# Patient Record
Sex: MALE | Race: WHITE | Employment: OTHER | URBAN - METROPOLITAN AREA
[De-identification: names, ages, dates, MRNs, and addresses within clinical notes are randomized per-mention and may not be internally consistent; named-entity substitution may affect disease eponyms.]

---

## 2024-09-19 ENCOUNTER — HOSPITAL ENCOUNTER (OUTPATIENT)
Age: 74
Setting detail: SPECIMEN
Discharge: HOME | End: 2024-09-19

## 2024-09-19 LAB
ANION GAP SERPL CALCULATED.3IONS-SCNC: 5 MMOL/L (ref 9–16)
BUN SERPL-MCNC: 17 MG/DL (ref 8–23)
CALCIUM SERPL-MCNC: 9.2 MG/DL (ref 8.6–10.4)
CHLORIDE SERPL-SCNC: 91 MMOL/L (ref 98–107)
CO2 SERPL-SCNC: 43 MMOL/L (ref 20–31)
CREAT SERPL-MCNC: 0.8 MG/DL (ref 0.7–1.2)
ERYTHROCYTE [DISTWIDTH] IN BLOOD BY AUTOMATED COUNT: 15 % (ref 11.8–14.4)
GFR, ESTIMATED: >90 ML/MIN/1.73M2
GLUCOSE SERPL-MCNC: 205 MG/DL (ref 74–99)
HCT VFR BLD AUTO: 32.7 % (ref 40.7–50.3)
HGB BLD-MCNC: 9.6 G/DL (ref 13–17)
MCH RBC QN AUTO: 29 PG (ref 25.2–33.5)
MCHC RBC AUTO-ENTMCNC: 29.4 G/DL (ref 28.4–34.8)
MCV RBC AUTO: 98.8 FL (ref 82.6–102.9)
NRBC BLD-RTO: 0 PER 100 WBC
PLATELET # BLD AUTO: 272 K/UL (ref 138–453)
PMV BLD AUTO: 10.9 FL (ref 8.1–13.5)
POTASSIUM SERPL-SCNC: 4.4 MMOL/L (ref 3.7–5.3)
RBC # BLD AUTO: 3.31 M/UL (ref 4.21–5.77)
SODIUM SERPL-SCNC: 139 MMOL/L (ref 136–145)
WBC OTHER # BLD: 11.8 K/UL (ref 3.5–11.3)

## 2024-09-19 PROCEDURE — 36415 COLL VENOUS BLD VENIPUNCTURE: CPT

## 2024-09-19 PROCEDURE — P9603 ONE-WAY ALLOW PRORATED MILES: HCPCS

## 2024-09-19 PROCEDURE — 80048 BASIC METABOLIC PNL TOTAL CA: CPT

## 2024-09-19 PROCEDURE — 85027 COMPLETE CBC AUTOMATED: CPT

## 2024-09-24 ENCOUNTER — APPOINTMENT (OUTPATIENT)
Dept: GENERAL RADIOLOGY | Age: 74
DRG: 189 | End: 2024-09-24
Attending: EMERGENCY MEDICINE
Payer: MEDICARE

## 2024-09-24 ENCOUNTER — APPOINTMENT (OUTPATIENT)
Dept: GENERAL RADIOLOGY | Age: 74
DRG: 189 | End: 2024-09-24
Payer: MEDICARE

## 2024-09-24 ENCOUNTER — HOSPITAL ENCOUNTER (INPATIENT)
Age: 74
LOS: 10 days | Discharge: NURSING, REHAB OR LONG-TERM CARE | DRG: 189 | End: 2024-10-04
Attending: EMERGENCY MEDICINE | Admitting: INTERNAL MEDICINE
Payer: MEDICARE

## 2024-09-24 DIAGNOSIS — J18.9 PNEUMONIA OF RIGHT LOWER LOBE DUE TO INFECTIOUS ORGANISM: ICD-10-CM

## 2024-09-24 DIAGNOSIS — J44.1 COPD EXACERBATION (HCC): Primary | ICD-10-CM

## 2024-09-24 DIAGNOSIS — J96.22 ACUTE ON CHRONIC RESPIRATORY FAILURE WITH HYPOXIA AND HYPERCAPNIA: ICD-10-CM

## 2024-09-24 DIAGNOSIS — R06.02 SHORTNESS OF BREATH: ICD-10-CM

## 2024-09-24 DIAGNOSIS — I48.0 PAROXYSMAL ATRIAL FIBRILLATION (HCC): ICD-10-CM

## 2024-09-24 DIAGNOSIS — J43.9 PULMONARY EMPHYSEMA, UNSPECIFIED EMPHYSEMA TYPE (HCC): ICD-10-CM

## 2024-09-24 DIAGNOSIS — J96.21 ACUTE ON CHRONIC RESPIRATORY FAILURE WITH HYPOXIA AND HYPERCAPNIA: ICD-10-CM

## 2024-09-24 PROBLEM — J16.0 CAP (COMMUNITY ACQUIRED PNEUMONIA) DUE TO CHLAMYDIA SPECIES: Status: ACTIVE | Noted: 2024-09-24

## 2024-09-24 LAB
ALBUMIN SERPL-MCNC: 3.7 G/DL (ref 3.5–5.2)
ALP SERPL-CCNC: 78 U/L (ref 40–129)
ALT SERPL-CCNC: 11 U/L (ref 5–41)
ANION GAP SERPL CALCULATED.3IONS-SCNC: ABNORMAL MMOL/L (ref 9–17)
ARTERIAL PATENCY WRIST A: ABNORMAL
AST SERPL-CCNC: 17 U/L
B PARAP IS1001 DNA NPH QL NAA+NON-PROBE: NOT DETECTED
B PERT DNA SPEC QL NAA+PROBE: NOT DETECTED
BASOPHILS # BLD: 0.1 K/UL (ref 0–0.2)
BASOPHILS NFR BLD: 1 % (ref 0–2)
BDY SITE: ABNORMAL
BILIRUB DIRECT SERPL-MCNC: 0.1 MG/DL
BILIRUB INDIRECT SERPL-MCNC: 0.5 MG/DL (ref 0–1)
BILIRUB SERPL-MCNC: 0.6 MG/DL (ref 0.3–1.2)
BODY TEMPERATURE: 37
BUN SERPL-MCNC: 15 MG/DL (ref 8–23)
C PNEUM DNA NPH QL NAA+NON-PROBE: NOT DETECTED
CALCIUM SERPL-MCNC: 9.3 MG/DL (ref 8.6–10.4)
CHLORIDE SERPL-SCNC: 87 MMOL/L (ref 98–107)
CO2 SERPL-SCNC: >45 MMOL/L (ref 20–31)
COHGB MFR BLD: 1.9 %
CREAT SERPL-MCNC: 0.6 MG/DL (ref 0.7–1.2)
EOSINOPHIL # BLD: 0.1 K/UL (ref 0–0.4)
EOSINOPHILS RELATIVE PERCENT: 1 % (ref 0–4)
ERYTHROCYTE [DISTWIDTH] IN BLOOD BY AUTOMATED COUNT: 15.8 % (ref 11.5–14.9)
FIO2 ON VENT: 40 %
FLUAV RNA NPH QL NAA+NON-PROBE: NOT DETECTED
FLUBV RNA NPH QL NAA+NON-PROBE: NOT DETECTED
GAS FLOW.O2 O2 DELIVERY SYS: ABNORMAL L/MIN
GFR, ESTIMATED: >90 ML/MIN/1.73M2
GLUCOSE SERPL-MCNC: 144 MG/DL (ref 70–99)
HADV DNA NPH QL NAA+NON-PROBE: NOT DETECTED
HCO3 ARTERIAL: 52.5 MMOL/L
HCOV 229E RNA NPH QL NAA+NON-PROBE: NOT DETECTED
HCOV HKU1 RNA NPH QL NAA+NON-PROBE: NOT DETECTED
HCOV NL63 RNA NPH QL NAA+NON-PROBE: NOT DETECTED
HCOV OC43 RNA NPH QL NAA+NON-PROBE: NOT DETECTED
HCT VFR BLD AUTO: 29.5 % (ref 41–53)
HGB BLD-MCNC: 9.6 G/DL (ref 13.5–17.5)
HMPV RNA NPH QL NAA+NON-PROBE: NOT DETECTED
HPIV1 RNA NPH QL NAA+NON-PROBE: NOT DETECTED
HPIV2 RNA NPH QL NAA+NON-PROBE: NOT DETECTED
HPIV3 RNA NPH QL NAA+NON-PROBE: NOT DETECTED
HPIV4 RNA NPH QL NAA+NON-PROBE: NOT DETECTED
LACTATE BLDV-SCNC: 0.7 MMOL/L (ref 0.5–1.9)
LACTATE BLDV-SCNC: 1.2 MMOL/L (ref 0.5–1.9)
LIPASE SERPL-CCNC: 17 U/L (ref 13–60)
LYMPHOCYTES NFR BLD: 1.2 K/UL (ref 1–4.8)
LYMPHOCYTES RELATIVE PERCENT: 11 % (ref 24–44)
M PNEUMO DNA NPH QL NAA+NON-PROBE: NOT DETECTED
MCH RBC QN AUTO: 29.9 PG (ref 26–34)
MCHC RBC AUTO-ENTMCNC: 32.7 G/DL (ref 31–37)
MCV RBC AUTO: 91.5 FL (ref 80–100)
METHEMOGLOBIN: 0.2 %
MONOCYTES NFR BLD: 1.4 K/UL (ref 0.1–1.3)
MONOCYTES NFR BLD: 13 % (ref 1–7)
NEUTROPHILS NFR BLD: 74 % (ref 36–66)
NEUTS SEG NFR BLD: 8.4 K/UL (ref 1.3–9.1)
O2 SAT, ARTERIAL: 96.7 %
PCO2 ARTERIAL: 94 MMHG
PH ARTERIAL: 7.36
PLATELET # BLD AUTO: 309 K/UL (ref 150–450)
PMV BLD AUTO: 8.3 FL (ref 6–12)
PO2 ARTERIAL: 119 MMHG
POSITIVE BASE EXCESS, ART: 27 MMOL/L (ref 0–2)
POTASSIUM SERPL-SCNC: 4.6 MMOL/L (ref 3.7–5.3)
PROCALCITONIN SERPL-MCNC: 0.06 NG/ML
PROT SERPL-MCNC: 7.1 G/DL (ref 6.4–8.3)
PT. POSITION: ABNORMAL
RBC # BLD AUTO: 3.23 M/UL (ref 4.5–5.9)
RESPIRATORY RATE: 22
RSV RNA NPH QL NAA+NON-PROBE: NOT DETECTED
RV+EV RNA NPH QL NAA+NON-PROBE: NOT DETECTED
SARS-COV-2 RNA NPH QL NAA+NON-PROBE: NOT DETECTED
SODIUM SERPL-SCNC: 137 MMOL/L (ref 135–144)
SPECIMEN DESCRIPTION: NORMAL
TEXT FOR RESPIRATORY: ABNORMAL
WBC OTHER # BLD: 11.2 K/UL (ref 3.5–11)

## 2024-09-24 PROCEDURE — 80048 BASIC METABOLIC PNL TOTAL CA: CPT

## 2024-09-24 PROCEDURE — 2060000000 HC ICU INTERMEDIATE R&B

## 2024-09-24 PROCEDURE — 85025 COMPLETE CBC W/AUTO DIFF WBC: CPT

## 2024-09-24 PROCEDURE — 94640 AIRWAY INHALATION TREATMENT: CPT

## 2024-09-24 PROCEDURE — 71045 X-RAY EXAM CHEST 1 VIEW: CPT

## 2024-09-24 PROCEDURE — 6360000002 HC RX W HCPCS: Performed by: INTERNAL MEDICINE

## 2024-09-24 PROCEDURE — 2580000003 HC RX 258

## 2024-09-24 PROCEDURE — 87641 MR-STAPH DNA AMP PROBE: CPT

## 2024-09-24 PROCEDURE — 6370000000 HC RX 637 (ALT 250 FOR IP): Performed by: EMERGENCY MEDICINE

## 2024-09-24 PROCEDURE — 2580000003 HC RX 258: Performed by: EMERGENCY MEDICINE

## 2024-09-24 PROCEDURE — 96375 TX/PRO/DX INJ NEW DRUG ADDON: CPT

## 2024-09-24 PROCEDURE — 36600 WITHDRAWAL OF ARTERIAL BLOOD: CPT

## 2024-09-24 PROCEDURE — 82805 BLOOD GASES W/O2 SATURATION: CPT

## 2024-09-24 PROCEDURE — 94761 N-INVAS EAR/PLS OXIMETRY MLT: CPT

## 2024-09-24 PROCEDURE — 93005 ELECTROCARDIOGRAM TRACING: CPT | Performed by: EMERGENCY MEDICINE

## 2024-09-24 PROCEDURE — 6370000000 HC RX 637 (ALT 250 FOR IP): Performed by: INTERNAL MEDICINE

## 2024-09-24 PROCEDURE — 0202U NFCT DS 22 TRGT SARS-COV-2: CPT

## 2024-09-24 PROCEDURE — 99285 EMERGENCY DEPT VISIT HI MDM: CPT

## 2024-09-24 PROCEDURE — 6360000002 HC RX W HCPCS: Performed by: EMERGENCY MEDICINE

## 2024-09-24 PROCEDURE — 96374 THER/PROPH/DIAG INJ IV PUSH: CPT

## 2024-09-24 PROCEDURE — 2500000003 HC RX 250 WO HCPCS: Performed by: EMERGENCY MEDICINE

## 2024-09-24 PROCEDURE — 99291 CRITICAL CARE FIRST HOUR: CPT | Performed by: INTERNAL MEDICINE

## 2024-09-24 PROCEDURE — 84145 PROCALCITONIN (PCT): CPT

## 2024-09-24 PROCEDURE — 36415 COLL VENOUS BLD VENIPUNCTURE: CPT

## 2024-09-24 PROCEDURE — 74018 RADEX ABDOMEN 1 VIEW: CPT

## 2024-09-24 PROCEDURE — 87449 NOS EACH ORGANISM AG IA: CPT

## 2024-09-24 PROCEDURE — 2580000003 HC RX 258: Performed by: INTERNAL MEDICINE

## 2024-09-24 PROCEDURE — 2700000000 HC OXYGEN THERAPY PER DAY

## 2024-09-24 PROCEDURE — 6370000000 HC RX 637 (ALT 250 FOR IP)

## 2024-09-24 PROCEDURE — 83690 ASSAY OF LIPASE: CPT

## 2024-09-24 PROCEDURE — 94660 CPAP INITIATION&MGMT: CPT

## 2024-09-24 PROCEDURE — 5A09457 ASSISTANCE WITH RESPIRATORY VENTILATION, 24-96 CONSECUTIVE HOURS, CONTINUOUS POSITIVE AIRWAY PRESSURE: ICD-10-PCS | Performed by: EMERGENCY MEDICINE

## 2024-09-24 PROCEDURE — 87899 AGENT NOS ASSAY W/OPTIC: CPT

## 2024-09-24 PROCEDURE — 83605 ASSAY OF LACTIC ACID: CPT

## 2024-09-24 PROCEDURE — 80076 HEPATIC FUNCTION PANEL: CPT

## 2024-09-24 RX ORDER — DIPHENHYDRAMINE HYDROCHLORIDE 50 MG/ML
25 INJECTION INTRAMUSCULAR; INTRAVENOUS ONCE
Status: COMPLETED | OUTPATIENT
Start: 2024-09-24 | End: 2024-09-24

## 2024-09-24 RX ORDER — LANOLIN ALCOHOL/MO/W.PET/CERES
3 CREAM (GRAM) TOPICAL NIGHTLY
Status: DISCONTINUED | OUTPATIENT
Start: 2024-09-24 | End: 2024-10-04 | Stop reason: HOSPADM

## 2024-09-24 RX ORDER — ACETAMINOPHEN 650 MG/1
650 SUPPOSITORY RECTAL EVERY 6 HOURS PRN
Status: DISCONTINUED | OUTPATIENT
Start: 2024-09-24 | End: 2024-10-04 | Stop reason: HOSPADM

## 2024-09-24 RX ORDER — ATORVASTATIN CALCIUM 40 MG/1
40 TABLET, FILM COATED ORAL EVERY EVENING
COMMUNITY

## 2024-09-24 RX ORDER — IPRATROPIUM BROMIDE AND ALBUTEROL SULFATE 2.5; .5 MG/3ML; MG/3ML
1 SOLUTION RESPIRATORY (INHALATION)
Status: DISCONTINUED | OUTPATIENT
Start: 2024-09-24 | End: 2024-09-29

## 2024-09-24 RX ORDER — SODIUM CHLORIDE 0.9 % (FLUSH) 0.9 %
5-40 SYRINGE (ML) INJECTION EVERY 12 HOURS SCHEDULED
Status: DISCONTINUED | OUTPATIENT
Start: 2024-09-24 | End: 2024-10-04 | Stop reason: HOSPADM

## 2024-09-24 RX ORDER — CARVEDILOL 6.25 MG/1
6.25 TABLET ORAL 2 TIMES DAILY
Status: ON HOLD | COMMUNITY
End: 2024-10-04 | Stop reason: HOSPADM

## 2024-09-24 RX ORDER — FUROSEMIDE 40 MG
40 TABLET ORAL EVERY MORNING
COMMUNITY

## 2024-09-24 RX ORDER — SODIUM CHLORIDE 0.9 % (FLUSH) 0.9 %
5-40 SYRINGE (ML) INJECTION PRN
Status: DISCONTINUED | OUTPATIENT
Start: 2024-09-24 | End: 2024-10-04 | Stop reason: HOSPADM

## 2024-09-24 RX ORDER — ONDANSETRON 2 MG/ML
4 INJECTION INTRAMUSCULAR; INTRAVENOUS EVERY 6 HOURS PRN
Status: DISCONTINUED | OUTPATIENT
Start: 2024-09-24 | End: 2024-10-04 | Stop reason: HOSPADM

## 2024-09-24 RX ORDER — PANTOPRAZOLE SODIUM 40 MG/1
40 TABLET, DELAYED RELEASE ORAL
Status: DISCONTINUED | OUTPATIENT
Start: 2024-09-25 | End: 2024-10-04 | Stop reason: HOSPADM

## 2024-09-24 RX ORDER — POTASSIUM CHLORIDE 7.45 MG/ML
10 INJECTION INTRAVENOUS PRN
Status: DISCONTINUED | OUTPATIENT
Start: 2024-09-24 | End: 2024-10-04 | Stop reason: HOSPADM

## 2024-09-24 RX ORDER — IPRATROPIUM BROMIDE AND ALBUTEROL SULFATE 2.5; .5 MG/3ML; MG/3ML
1 SOLUTION RESPIRATORY (INHALATION)
Status: DISCONTINUED | OUTPATIENT
Start: 2024-09-24 | End: 2024-10-04 | Stop reason: HOSPADM

## 2024-09-24 RX ORDER — IPRATROPIUM BROMIDE AND ALBUTEROL SULFATE 2.5; .5 MG/3ML; MG/3ML
1 SOLUTION RESPIRATORY (INHALATION) EVERY 4 HOURS PRN
Status: ON HOLD | COMMUNITY
End: 2024-10-04 | Stop reason: HOSPADM

## 2024-09-24 RX ORDER — ONDANSETRON 4 MG/1
4 TABLET, ORALLY DISINTEGRATING ORAL EVERY 8 HOURS PRN
Status: DISCONTINUED | OUTPATIENT
Start: 2024-09-24 | End: 2024-10-04 | Stop reason: HOSPADM

## 2024-09-24 RX ORDER — FLUTICASONE PROPIONATE AND SALMETEROL 500; 50 UG/1; UG/1
1 POWDER RESPIRATORY (INHALATION) 2 TIMES DAILY
COMMUNITY

## 2024-09-24 RX ORDER — MAGNESIUM SULFATE HEPTAHYDRATE 40 MG/ML
2000 INJECTION, SOLUTION INTRAVENOUS ONCE
Status: COMPLETED | OUTPATIENT
Start: 2024-09-24 | End: 2024-09-24

## 2024-09-24 RX ORDER — BISACODYL 10 MG
10 SUPPOSITORY, RECTAL RECTAL DAILY PRN
COMMUNITY

## 2024-09-24 RX ORDER — POTASSIUM CHLORIDE 1500 MG/1
40 TABLET, EXTENDED RELEASE ORAL PRN
Status: DISCONTINUED | OUTPATIENT
Start: 2024-09-24 | End: 2024-10-04 | Stop reason: HOSPADM

## 2024-09-24 RX ORDER — ATORVASTATIN CALCIUM 40 MG/1
40 TABLET, FILM COATED ORAL EVERY EVENING
Status: DISCONTINUED | OUTPATIENT
Start: 2024-09-24 | End: 2024-10-04 | Stop reason: HOSPADM

## 2024-09-24 RX ORDER — FUROSEMIDE 40 MG
40 TABLET ORAL EVERY MORNING
Status: DISCONTINUED | OUTPATIENT
Start: 2024-09-25 | End: 2024-09-27

## 2024-09-24 RX ORDER — MAGNESIUM SULFATE HEPTAHYDRATE 40 MG/ML
2000 INJECTION, SOLUTION INTRAVENOUS PRN
Status: DISCONTINUED | OUTPATIENT
Start: 2024-09-24 | End: 2024-10-04 | Stop reason: HOSPADM

## 2024-09-24 RX ORDER — ALBUTEROL SULFATE 90 UG/1
2 INHALANT RESPIRATORY (INHALATION) EVERY 6 HOURS PRN
COMMUNITY

## 2024-09-24 RX ORDER — DIAZEPAM 5 MG
5 TABLET ORAL 2 TIMES DAILY PRN
Status: ON HOLD | COMMUNITY
End: 2024-10-04 | Stop reason: HOSPADM

## 2024-09-24 RX ORDER — POTASSIUM CHLORIDE 1500 MG/1
20 TABLET, EXTENDED RELEASE ORAL EVERY MORNING
COMMUNITY

## 2024-09-24 RX ORDER — ACETAMINOPHEN 325 MG/1
650 TABLET ORAL EVERY 6 HOURS PRN
COMMUNITY

## 2024-09-24 RX ORDER — ACETAMINOPHEN 325 MG/1
650 TABLET ORAL EVERY 6 HOURS PRN
Status: DISCONTINUED | OUTPATIENT
Start: 2024-09-24 | End: 2024-10-04 | Stop reason: HOSPADM

## 2024-09-24 RX ORDER — SODIUM CHLORIDE 9 MG/ML
INJECTION, SOLUTION INTRAVENOUS PRN
Status: DISCONTINUED | OUTPATIENT
Start: 2024-09-24 | End: 2024-10-04 | Stop reason: HOSPADM

## 2024-09-24 RX ORDER — POLYETHYLENE GLYCOL 3350 17 G/17G
17 POWDER, FOR SOLUTION ORAL DAILY PRN
Status: DISCONTINUED | OUTPATIENT
Start: 2024-09-24 | End: 2024-10-04 | Stop reason: HOSPADM

## 2024-09-24 RX ORDER — CALCIUM CARBONATE 500 MG/1
2 TABLET, CHEWABLE ORAL EVERY 6 HOURS PRN
COMMUNITY

## 2024-09-24 RX ORDER — CARVEDILOL 6.25 MG/1
6.25 TABLET ORAL 2 TIMES DAILY
Status: DISCONTINUED | OUTPATIENT
Start: 2024-09-24 | End: 2024-09-30

## 2024-09-24 RX ADMIN — SODIUM CHLORIDE, PRESERVATIVE FREE 10 ML: 5 INJECTION INTRAVENOUS at 21:23

## 2024-09-24 RX ADMIN — WATER 40 MG: 1 INJECTION INTRAMUSCULAR; INTRAVENOUS; SUBCUTANEOUS at 21:22

## 2024-09-24 RX ADMIN — VANCOMYCIN HYDROCHLORIDE 1750 MG: 1 INJECTION, POWDER, LYOPHILIZED, FOR SOLUTION INTRAVENOUS at 18:36

## 2024-09-24 RX ADMIN — IPRATROPIUM BROMIDE AND ALBUTEROL SULFATE 1 DOSE: 2.5; .5 SOLUTION RESPIRATORY (INHALATION) at 12:56

## 2024-09-24 RX ADMIN — IPRATROPIUM BROMIDE AND ALBUTEROL SULFATE 1 DOSE: 2.5; .5 SOLUTION RESPIRATORY (INHALATION) at 12:46

## 2024-09-24 RX ADMIN — CEFTRIAXONE SODIUM 1000 MG: 1 INJECTION, POWDER, FOR SOLUTION INTRAMUSCULAR; INTRAVENOUS at 13:55

## 2024-09-24 RX ADMIN — Medication 3 MG: at 21:22

## 2024-09-24 RX ADMIN — MAGNESIUM SULFATE HEPTAHYDRATE 2000 MG: 40 INJECTION, SOLUTION INTRAVENOUS at 12:33

## 2024-09-24 RX ADMIN — APIXABAN 5 MG: 5 TABLET, FILM COATED ORAL at 21:22

## 2024-09-24 RX ADMIN — CARVEDILOL 6.25 MG: 6.25 TABLET, FILM COATED ORAL at 21:23

## 2024-09-24 RX ADMIN — WATER 40 MG: 1 INJECTION INTRAMUSCULAR; INTRAVENOUS; SUBCUTANEOUS at 17:31

## 2024-09-24 RX ADMIN — CEFEPIME 2000 MG: 2 INJECTION, POWDER, FOR SOLUTION INTRAVENOUS at 17:40

## 2024-09-24 RX ADMIN — CEFEPIME 2000 MG: 2 INJECTION, POWDER, FOR SOLUTION INTRAVENOUS at 22:06

## 2024-09-24 RX ADMIN — IPRATROPIUM BROMIDE AND ALBUTEROL SULFATE 1 DOSE: 2.5; .5 SOLUTION RESPIRATORY (INHALATION) at 19:22

## 2024-09-24 RX ADMIN — DIPHENHYDRAMINE HYDROCHLORIDE 25 MG: 50 INJECTION INTRAMUSCULAR; INTRAVENOUS at 13:16

## 2024-09-24 ASSESSMENT — PAIN SCALES - GENERAL
PAINLEVEL_OUTOF10: 0
PAINLEVEL_OUTOF10: 8

## 2024-09-24 ASSESSMENT — LIFESTYLE VARIABLES
HOW MANY STANDARD DRINKS CONTAINING ALCOHOL DO YOU HAVE ON A TYPICAL DAY: 1 OR 2
HOW OFTEN DO YOU HAVE A DRINK CONTAINING ALCOHOL: 2-4 TIMES A MONTH

## 2024-09-24 ASSESSMENT — PAIN DESCRIPTION - ORIENTATION: ORIENTATION: RIGHT;UPPER

## 2024-09-24 ASSESSMENT — PAIN DESCRIPTION - LOCATION: LOCATION: ABDOMEN

## 2024-09-24 NOTE — PROGRESS NOTES
Writer called [facility] to have pt's paperwork faxed over. [facility] to fax paperwork.

## 2024-09-24 NOTE — H&P
[facility]  IN-PATIENT SERVICE  [facility]  IN-PATIENT SERVICE   [facility]     HISTORY AND PHYSICAL EXAMINATION            Date:   9/24/2024  Patient name:  Timothy Kern  Date of admission:  9/24/2024 12:33 PM  MRN:   [id number]  Account:  [id number]  YOB: 1950  PCP:    No primary care provider on file.  Room:   2006/2006-01  Code Status:    DNR-CCA    Chief Complaint:     Chief Complaint   Patient presents with    Shortness of Breath       History Obtained From:     patient    History of Present Illness:     The patient is a 74 y.o.  Unavailable / unknown male who presents withShortness of Breath   and he is admitted to the hospital for the management of COPD exacerbation.    He has a past medical history of COPD (on 3 L of oxygen at home), atrial fibrillation (on Eliquis), and congestive heart failure.  He was living in [place] and then moved to [place].  We do not have any medical records for him.  Today the patient was transferred from [facility] for shortness of breath.  Since the last 2 weeks, his breathing got worse at nursing facility.  There is no history of fever, chills, chest pain.  On examination, sounds were absent on the right side, diffuse wheezing on the left side.    On presentation, he was severely tachypneic (respiratory rate 34) and hypoxic.  EMS put him on CPAP, which helped him.  He does have a history of leg swelling, but there was no swelling on presentation.    ABG showed chronic CO2 retention.  ABGs showed chronic CO2 retention, pCO2 is 95, bicarb 52.5, almost normal pH.  Chest x-ray showed patchy infiltrates in the right mid and lower lung lobe with underlying discoid atelectasis.    He also complained of right upper quadrant pain, which was severe on deep palpation.  Pain was not radiating to anywhere else.    WBC count was 11.2, Pro-Star 0.06.      Past Medical     Anion Gap Unable to calculate anion gap due to CO2 >45. 9 - 17 mmol/L    Glucose 144 (H) 70 - 99 mg/dL    BUN 15 8 - 23 mg/dL    Creatinine 0.6 (L) 0.7 - 1.2 mg/dL    Est, Glom Filt Rate >90 >60 mL/min/1.73m2    Calcium 9.3 8.6 - 10.4 mg/dL   Procalcitonin    Collection Time: 09/24/24 12:30 PM   Result Value Ref Range    Procalcitonin 0.06 <0.09 ng/mL   Blood Gas, Arterial    Collection Time: 09/24/24  1:05 PM   Result Value Ref Range    pH, Arterial 7.355     pCO2, Arterial 94.0 mmHg    pO2, Arterial 119.0 mmHg    HCO3, Arterial 52.5 mmol/L    Positive Base Excess, Art 27.0 (H) 0.0 - 2.0 mmol/L    O2 Sat, Arterial 96.7 %    Carboxyhemoglobin 1.9 %    Methemoglobin 0.2 %    Pt Temp 37.0     O2 Device/Flow/% BIPAP 12/6     Respiratory Rate 22     Beau Test PASS     Sample Site Right Radial Artery     Pt. Position SEMI-FOWLERS     FIO2 40     Text for Respiratory PATIENT PLACED ON 30% FIO2    EKG 12 Lead    Collection Time: 09/24/24  1:11 PM   Result Value Ref Range    Ventricular Rate 98 BPM    Atrial Rate 98 BPM    P-R Interval 138 ms    QRS Duration 76 ms    Q-T Interval 306 ms    QTc Calculation (Bazett) 390 ms    P Axis 52 degrees    R Axis 7 degrees    T Axis 58 degrees       Imaging/Diagnostics:  XR CHEST PORTABLE    Result Date: 9/24/2024  EXAMINATION: ONE XRAY VIEW OF THE CHEST 9/24/2024 9:31 am COMPARISON: None. HISTORY: ORDERING SYSTEM PROVIDED HISTORY: shortness of breath TECHNOLOGIST PROVIDED HISTORY: shortness of breath Reason for Exam: shortness of breath FINDINGS: Cardiac size is mildly enlarged.  Patchy infiltrate seen in the right mid and lower lung zones with underlying discoid atelectasis..  The pulmonary vascularity is hazy and indistinct. No pneumothorax.  No pleural effusions identified.  Posttraumatic deformity of the mid left clavicle.     1. Patchy infiltrate seen in the right mid and lower lung zones with underlying discoid atelectasis. 2. Mild cardiomegaly with mild pulmonary vascular

## 2024-09-24 NOTE — PROGRESS NOTES
Pharmacy Medication History Note      List of current medications patient is taking is complete.    Source of information: [employer], Care Everywhere, [facility], [facility]     Changes made to medication list:  Medications removed (include reason, ex. therapy complete or physician discontinued, noncompliance):  None    Medications flagged for provider review:  None    Medications added/doses adjusted:  Tylenol 325 mg - Reported on medication list  Albuterol HFA - Reported on medication list  Eliquis 5 mg - Reported on medication list  Atorvastatin 40 mg - Reported on medication list  Bisacodyl 10 mg supp - Reported on medication list  Tums 500 mg - Reported on medication list  Carvedilol 6.25 mg - Reported on medication list  Diazepam 5 mg - Reported on medication list  Advair - Reported on medication list  Furosemide 40 mg - Reported on medication list  Duoneb - Reported on medication list  Milk of magnesia - Reported on medication list  Omeprazole 20 mg - Reported on medication list  Potassium chloride 20 mEq - Reported on medication list  Fleet enema - Reported on medication list    Other notes (ex. Recent course of antibiotics, Coumadin dosing):  Unable to speak with the patient directly due to condition. However, [facility] provided a medication list upon patient transfer to the ED. However, there are not any specifics provided about when the last dose was given.  Per [facility] report, diazepam 5 mg filled 9/1 with a quantity of 12 for a 4 day supply. The patient also gets diazepam 5 mg as needed while at [facility].       Current Home Medication List at Time of Admission:  Prior to Admission medications    Medication Sig   acetaminophen (TYLENOL) 325 MG tablet Take 2 tablets by mouth every 6 hours as needed for Pain or Fever (Temperature > 101F) Do not exceed 3g/24 hours   fluticasone-salmeterol (ADVAIR DISKUS) 500-50 MCG/ACT AEPB diskus inhaler Inhale 1 puff into the lungs 2 times daily   albuterol sulfate  HFA (VENTOLIN HFA) 108 (90 Base) MCG/ACT inhaler Inhale 2 puffs into the lungs every 6 hours as needed for Wheezing   atorvastatin (LIPITOR) 40 MG tablet Take 1 tablet by mouth every evening   calcium carbonate (TUMS) 500 MG chewable tablet Take 2 tablets by mouth every 6 hours as needed for Heartburn   carvedilol (COREG) 6.25 MG tablet Take 1 tablet by mouth 2 times daily   diazePAM (VALIUM) 5 MG tablet Take 1 tablet by mouth 2 times daily as needed for Anxiety. Morning and evening Max Daily Amount: 10 mg   bisacodyl (DULCOLAX) 10 MG suppository Place 1 suppository rectally daily as needed for Constipation (constipation) Administer if no relief from Milk of Magnesia   apixaban (ELIQUIS) 5 MG TABS tablet Take 1 tablet by mouth 2 times daily   sodium phosphate (FLEET) 7-19 GM/118ML Place 1 enema rectally once as needed (constipation) Administer if no relief from dulcolax   furosemide (LASIX) 40 MG tablet Take 1 tablet by mouth every morning   ipratropium 0.5 mg-albuterol 2.5 mg (DUONEB) 0.5-2.5 (3) MG/3ML SOLN nebulizer solution Inhale 3 mLs into the lungs every 4 hours as needed for Shortness of Breath   magnesium hydroxide (MILK OF MAGNESIA) 400 MG/5ML suspension Take 30 mLs by mouth every 72 hours as needed for Constipation If no BM for 3 days   omeprazole (PRILOSEC) 20 MG delayed release capsule Take 1 capsule by mouth every morning   potassium chloride (K-TAB) 20 MEQ TBCR extended release tablet Take 1 tablet by mouth every morning         Please let me know if you have any questions about this encounter. Thank you!    Electronically signed by Radha Hinojosa on 9/24/2024 at 3:23 PM

## 2024-09-24 NOTE — ED NOTES
Mode of arrival (squad #, walk in, police, etc) : Medic 42          Arrival Note (brief scenario, treatment PTA, etc).: patient is from a nursing facility and has shortness of breath. Patient reports it's been ongoing for about 2 weeks and the nursing home hasn't taken him seriously. EMS reports upon arrival he was lows 80s on nasal canula. He arrived to ED on CPAP. Patient A&OX4. Patient received 125mg of solu-medrol in EMS & duoneb treatment

## 2024-09-24 NOTE — ED PROVIDER NOTES
eMERGENCY dEPARTMENT eNCOUnter      Pt Name: Timothy Kern  MRN: [id number]  Birthdate 1950  Date of evaluation: 9/24/24      CHIEF COMPLAINT       Chief Complaint   Patient presents with    Shortness of Breath         HISTORY OF PRESENT ILLNESS    Timothy Kern is a 74 y.o. male who presents complaining of shortness of breath.  Patient is a poor historian states that he has been feeling short of breath for a long time.  Patient is sent in from the nursing home due to respiratory distress.  EMS had the patient on CPAP due to his respiratory distress and hypoxia.  Patient states that he is does not have a cough sometimes gets leg swelling but not really having any currently.  Patient denies any fevers.  Patient does have a history of COPD and atrial fibrillation.      REVIEW OF SYSTEMS       Review of Systems   Constitutional:  Negative for activity change, appetite change, chills, diaphoresis and fever.   HENT:  Negative for congestion, ear pain, facial swelling, nosebleeds, rhinorrhea, sinus pressure, sore throat and trouble swallowing.    Eyes:  Negative for pain, discharge and redness.   Respiratory:  Positive for shortness of breath. Negative for cough, chest tightness and wheezing.    Cardiovascular:  Negative for chest pain, palpitations and leg swelling.   Gastrointestinal:  Negative for abdominal pain, blood in stool, constipation, diarrhea, nausea and vomiting.   Genitourinary:  Negative for difficulty urinating, dysuria, flank pain, frequency, genital sores and hematuria.   Musculoskeletal:  Negative for arthralgias, back pain, gait problem, joint swelling, myalgias and neck pain.   Skin:  Negative for color change, pallor, rash and wound.   Neurological:  Negative for dizziness, tremors, seizures, syncope, speech difficulty, weakness, numbness and headaches.   Psychiatric/Behavioral:  Negative for confusion, decreased concentration, hallucinations, self-injury, sleep disturbance and suicidal ideas.    Psychiatric:         Behavior: Behavior normal.         Thought Content: Thought content normal.         Judgment: Judgment normal.           MEDICAL DECISION MAKIN)  Number and Complexity of Problems  Problem List This Visit:  Shortness of breath    Differential Diagnosis:  Acute hypoxic respiratory failure, pneumonia, COPD exacerbation    Diagnoses Considered but Do Not Suspect:  ACS, PE    Pertinent Comorbid Conditions:  COPD, atrial fibrillation    2)  Data Reviewed  Decision Rules/Scores/MIPS utilized:  None    EKG Interpretation:  EKG Interpretation    Interpreted by me    Rhythm: normal sinus   Rate: normal  Axis: normal  Ectopy: none  Conduction: normal  ST Segments: no acute change  T Waves: no acute change  Q Waves: none    Clinical Impression: no acute changes and normal EKG    External Documents Reviewed:  None    Imaging that is independently reviewed and interpreted by me as:  None    See more data below for the lab and radiology tests and orders.    3)  Treatment and Disposition      \"ED Course\" Notes From Epic Narrator:  ED Course as of 24 1327   Tue Sep 24, 2024   130 It was noticed that the patient started getting a rash around where her mask was that was not there on the EMSs mask.  Not sure if patient is ever had this mask on before but it does seem like it could be an allergic reaction even though it is a hypoallergenic seal so we will give him some Benadryl see if that helps. [JL]   1315 Discussed the case with Dr. Matthew for the hospitalist group who agrees to admit the patient and consult with pulmonary critical care. [JL]   1326 Discussed the case with Dr. Mcguire from critical care pulmonology who will follow up with the son to get more information but otherwise is okay with the current plan. [JL]      ED Course User Index  [JL] Bruno Ferreira MD       CRITICAL CARE: There was a high probability of clinically significant/life threatening deterioration in this patient's

## 2024-09-24 NOTE — PLAN OF CARE
Problem: Discharge Planning  Goal: Discharge to home or other facility with appropriate resources  9/24/2024 1827 by Tammie Darden, RN  Outcome: Progressing  Flowsheets (Taken 9/24/2024 1827)  Discharge to home or other facility with appropriate resources: Refer to discharge planning if patient needs post-hospital services based on physician order or complex needs related to functional status, cognitive ability or social support system  9/24/2024 1810 by Tammie Darden RN  Outcome: Progressing     Problem: Pain  Goal: Verbalizes/displays adequate comfort level or baseline comfort level  9/24/2024 1827 by Tammie Darden, RN  Outcome: Progressing  Flowsheets (Taken 9/24/2024 1827)  Verbalizes/displays adequate comfort level or baseline comfort level:   Assess pain using appropriate pain scale   Implement non-pharmacological measures as appropriate and evaluate response  9/24/2024 1810 by Tammie Darden, RN  Outcome: Progressing     Problem: Safety - Adult  Goal: Free from fall injury  9/24/2024 1827 by Tammie Darden RN  Outcome: Progressing  Flowsheets (Taken 9/24/2024 1827)  Free From Fall Injury: Based on caregiver fall risk screen, instruct family/caregiver to ask for assistance with transferring infant if caregiver noted to have fall risk factors  9/24/2024 1810 by Tammie Darden RN  Outcome: Progressing     Problem: Skin/Tissue Integrity  Goal: Absence of new skin breakdown  Description: 1.  Monitor for areas of redness and/or skin breakdown  2.  Assess vascular access sites hourly  3.  Every 4-6 hours minimum:  Change oxygen saturation probe site  4.  Every 4-6 hours:  If on nasal continuous positive airway pressure, respiratory therapy assess nares and determine need for appliance change or resting period.  9/24/2024 1827 by Tammie Darden, RN  Outcome: Progressing  9/24/2024 1810 by Tammie Darden RN  Outcome: Progressing     Problem: Respiratory - Adult  Goal: Achieves optimal ventilation and oxygenation  Outcome:

## 2024-09-24 NOTE — PROGRESS NOTES
[facility]   Pharmacy Pharmacokinetic Monitoring Service - Vancomycin     Timothy Kern is a 74 y.o. male starting on vancomycin therapy for   Indication: Respiratory infection, MRSA suspected. Pharmacy consulted by Dr. Mcguire for monitoring and adjustment.    Target Concentration: Goal AUC/RENEE 400-600 mg*hr/L    Additional Antimicrobials: cefepime    Pertinent Laboratory Values:   Wt Readings from Last 1 Encounters:   09/24/24 69.4 kg (153 lb)     Temp Readings from Last 1 Encounters:   09/24/24 97.4 °F (36.3 °C) (Oral)     Estimated Creatinine Clearance: 109 mL/min (A) (based on SCr of 0.6 mg/dL (L)).  Recent Labs     09/24/24  1230   CREATININE 0.6*   BUN 15   WBC 11.2*       Pertinent Cultures: see micro  MRSA Nasal Swab: was ordered by provider, awaiting results.        Plan:  Dosing recommendations based on Bayesian software  Start vancomycin 1750mg x1, followed by 1250mg every 12 hours.  Renal labs as indicated   Vancomycin concentration ordered for 9/25 @ 0600   Pharmacy will continue to monitor patient and adjust therapy as indicated    Thank you for the consult,  Bossman Lawson [facility]  9/24/2024 3:21 PM

## 2024-09-24 NOTE — ED NOTES
Adult Non-Invasive Positive Pressure Ventilation for Acute Respiratory Distress  Patient & Family Education Note     Patient: Timothy Kern  Age: 74 y.o.     The patient and/or family has been educated on the following items and have verbalized understanding and agreement:    [x]Patient and/or family have been educated on the purpose and advantages of NIV and have verbalized understanding and agreement.  [x]Patient and/or family is in agreement that the patient will be NPO (Nothing by Mouth) for the duration of NIV use.  [x]Patient and/or  family is in agreement that NIV will not be routinely disrupted except to complete oral care.  [x]Patient and/or family have been educated on the level of care, vital signs frequency and monitoring requirements for NIV and are in agreement.  [x]Patient and/or family have been educated on reporting any nausea, chest discomfort, sudden increase in shortness of breath, or a severe headache to the staff immediately.

## 2024-09-24 NOTE — CONSULTS
[facility] PULMONARY & CRITICAL CARE SPECIALISTS  Benjie Pardo MD/Giorgio HOLLINGSWORTH AGACNP-BC, NP-C      Abby HOLLINGSWORTH NP-C      Félix HOLLINGSWORTH NP-C     Pulmonary and Critical Care CONSULT NOTE:      DATE OF CONSULT 9/24/2024    REASON FOR CONSULTATION:  Acute on chronic hypercapnia      PCP No primary care provider on file.     CHIEF COMPLAINT: Shortness of breath    HISTORY OF PRESENT ILLNESS:   This is a chronically ill 74-year-old gentleman with end-stage COPD.  He has lived in [place].  He was on oxygen in [place].  Typically he would go to [facility].  He tells me he did have a pulmonary physician when he was in [place].  He last smoked a cigarette in January of this year.  Approximately 6 months ago he moved into [facility] to be closer to his mother.  He was in and out of the hospital in [facility] in [place].  The son is trying to find the name of the hospital for us.  He was moved 2 weeks ago to a nursing facility here in the [facility] because he has 3 children in the [facility] and 1 child in the [place] area.  He was brought here to be closer to family.  Since arriving in the nursing home he has been stating that he does not feel well and feels like his breathing is deteriorating.  He was not transferred over to the hospital until today.  We have almost no records on this patient.    ABG shows profound hypercapnia with pCO2 95 with nearly normal pH.  He is a little acidotic.  I do note that his bicarbonate is extremely elevated suggesting significant subacute hypercapnia    I do not have a previous chest x-ray for comparison but currently he has some streaky infiltrates potentially scarring versus pneumonia in the right lung superimposed on emphysema    I did speak to the patient's son Melvin.  He really has not had a relationship with his father until the past 2 months.  He does not know a lot of his medical

## 2024-09-24 NOTE — PROGRESS NOTES
Patient arrived to ICU 2006 from ER. Transferred to bed per staff, bedside monitor applied. RN at bedside to assess.

## 2024-09-25 ENCOUNTER — APPOINTMENT (OUTPATIENT)
Dept: GENERAL RADIOLOGY | Age: 74
DRG: 189 | End: 2024-09-25
Payer: MEDICARE

## 2024-09-25 ENCOUNTER — APPOINTMENT (OUTPATIENT)
Dept: CT IMAGING | Age: 74
DRG: 189 | End: 2024-09-25
Payer: MEDICARE

## 2024-09-25 LAB
ANION GAP SERPL CALCULATED.3IONS-SCNC: 5 MMOL/L (ref 9–17)
BASOPHILS # BLD: 0 K/UL (ref 0–0.2)
BASOPHILS NFR BLD: 0 % (ref 0–2)
BUN SERPL-MCNC: 27 MG/DL (ref 8–23)
CALCIUM SERPL-MCNC: 8.7 MG/DL (ref 8.6–10.4)
CHLORIDE SERPL-SCNC: 90 MMOL/L (ref 98–107)
CO2 SERPL-SCNC: 41 MMOL/L (ref 20–31)
COHGB MFR BLD: 3.1 % (ref 0–5)
CREAT SERPL-MCNC: 0.6 MG/DL (ref 0.7–1.2)
EKG ATRIAL RATE: 98 BPM
EKG P AXIS: 52 DEGREES
EKG P-R INTERVAL: 138 MS
EKG Q-T INTERVAL: 306 MS
EKG QRS DURATION: 76 MS
EKG QTC CALCULATION (BAZETT): 390 MS
EKG R AXIS: 7 DEGREES
EKG T AXIS: 58 DEGREES
EKG VENTRICULAR RATE: 98 BPM
EOSINOPHIL # BLD: 0 K/UL (ref 0–0.4)
EOSINOPHILS RELATIVE PERCENT: 0 % (ref 0–4)
ERYTHROCYTE [DISTWIDTH] IN BLOOD BY AUTOMATED COUNT: 15.9 % (ref 11.5–14.9)
FERRITIN SERPL-MCNC: 68 NG/ML (ref 30–400)
FOLATE SERPL-MCNC: 14.6 NG/ML (ref 4.8–24.2)
GAS FLOW.O2 O2 DELIVERY SYS: ABNORMAL L/MIN
GFR, ESTIMATED: >90 ML/MIN/1.73M2
GLUCOSE SERPL-MCNC: 180 MG/DL (ref 70–99)
HCO3 VENOUS: 46.4 MMOL/L (ref 24–30)
HCT VFR BLD AUTO: 26 % (ref 41–53)
HGB BLD-MCNC: 8.6 G/DL (ref 13.5–17.5)
IRON SATN MFR SERPL: 9 % (ref 20–55)
IRON SERPL-MCNC: 31 UG/DL (ref 61–157)
L PNEUMO1 AG UR QL IA.RAPID: NEGATIVE
LYMPHOCYTES NFR BLD: 0.4 K/UL (ref 1–4.8)
LYMPHOCYTES RELATIVE PERCENT: 6 % (ref 24–44)
MCH RBC QN AUTO: 30.1 PG (ref 26–34)
MCHC RBC AUTO-ENTMCNC: 32.9 G/DL (ref 31–37)
MCV RBC AUTO: 91.5 FL (ref 80–100)
METHEMOGLOBIN: 1.2 % (ref 0–1.9)
MONOCYTES NFR BLD: 0.4 K/UL (ref 0.1–1.3)
MONOCYTES NFR BLD: 5 % (ref 1–7)
MRSA, DNA, NASAL: ABNORMAL
NEUTROPHILS NFR BLD: 89 % (ref 36–66)
NEUTS SEG NFR BLD: 6.5 K/UL (ref 1.3–9.1)
O2 SAT, VEN: 83.1 % (ref 60–85)
PCO2 VENOUS: 58 MM HG (ref 39–55)
PH VENOUS: 7.51 (ref 7.32–7.42)
PLATELET # BLD AUTO: 272 K/UL (ref 150–450)
PMV BLD AUTO: 8.2 FL (ref 6–12)
PO2 VENOUS: 45.6 MM HG (ref 30–50)
POSITIVE BASE EXCESS, VEN: 23.4 MMOL/L (ref 0–2)
POTASSIUM SERPL-SCNC: 4.8 MMOL/L (ref 3.7–5.3)
RBC # BLD AUTO: 2.85 M/UL (ref 4.5–5.9)
S PNEUM AG SPEC QL: NEGATIVE
SODIUM SERPL-SCNC: 136 MMOL/L (ref 135–144)
SPECIMEN DESCRIPTION: ABNORMAL
SPECIMEN SOURCE: NORMAL
TEXT FOR RESPIRATORY: ABNORMAL
TIBC SERPL-MCNC: 349 UG/DL (ref 250–450)
UNSATURATED IRON BINDING CAPACITY: 318 UG/DL (ref 112–347)
VIT B12 SERPL-MCNC: 294 PG/ML (ref 232–1245)
WBC OTHER # BLD: 7.3 K/UL (ref 3.5–11)

## 2024-09-25 PROCEDURE — 2580000003 HC RX 258

## 2024-09-25 PROCEDURE — 82800 BLOOD PH: CPT

## 2024-09-25 PROCEDURE — 6370000000 HC RX 637 (ALT 250 FOR IP)

## 2024-09-25 PROCEDURE — 2060000000 HC ICU INTERMEDIATE R&B

## 2024-09-25 PROCEDURE — 94761 N-INVAS EAR/PLS OXIMETRY MLT: CPT

## 2024-09-25 PROCEDURE — 97162 PT EVAL MOD COMPLEX 30 MIN: CPT

## 2024-09-25 PROCEDURE — 83550 IRON BINDING TEST: CPT

## 2024-09-25 PROCEDURE — 2580000003 HC RX 258: Performed by: INTERNAL MEDICINE

## 2024-09-25 PROCEDURE — 97166 OT EVAL MOD COMPLEX 45 MIN: CPT

## 2024-09-25 PROCEDURE — 94640 AIRWAY INHALATION TREATMENT: CPT

## 2024-09-25 PROCEDURE — 36415 COLL VENOUS BLD VENIPUNCTURE: CPT

## 2024-09-25 PROCEDURE — 80048 BASIC METABOLIC PNL TOTAL CA: CPT

## 2024-09-25 PROCEDURE — 6360000002 HC RX W HCPCS: Performed by: INTERNAL MEDICINE

## 2024-09-25 PROCEDURE — 85025 COMPLETE CBC W/AUTO DIFF WBC: CPT

## 2024-09-25 PROCEDURE — 99233 SBSQ HOSP IP/OBS HIGH 50: CPT | Performed by: INTERNAL MEDICINE

## 2024-09-25 PROCEDURE — 94660 CPAP INITIATION&MGMT: CPT

## 2024-09-25 PROCEDURE — 82746 ASSAY OF FOLIC ACID SERUM: CPT

## 2024-09-25 PROCEDURE — 82728 ASSAY OF FERRITIN: CPT

## 2024-09-25 PROCEDURE — 94664 DEMO&/EVAL PT USE INHALER: CPT

## 2024-09-25 PROCEDURE — 97530 THERAPEUTIC ACTIVITIES: CPT

## 2024-09-25 PROCEDURE — 83540 ASSAY OF IRON: CPT

## 2024-09-25 PROCEDURE — 71250 CT THORAX DX C-: CPT

## 2024-09-25 PROCEDURE — 71045 X-RAY EXAM CHEST 1 VIEW: CPT

## 2024-09-25 PROCEDURE — 6370000000 HC RX 637 (ALT 250 FOR IP): Performed by: INTERNAL MEDICINE

## 2024-09-25 PROCEDURE — 82607 VITAMIN B-12: CPT

## 2024-09-25 PROCEDURE — 2700000000 HC OXYGEN THERAPY PER DAY

## 2024-09-25 PROCEDURE — 82805 BLOOD GASES W/O2 SATURATION: CPT

## 2024-09-25 PROCEDURE — 93010 ELECTROCARDIOGRAM REPORT: CPT | Performed by: INTERNAL MEDICINE

## 2024-09-25 RX ORDER — DOXYCYCLINE 100 MG/1
100 CAPSULE ORAL EVERY 12 HOURS SCHEDULED
Status: COMPLETED | OUTPATIENT
Start: 2024-09-25 | End: 2024-09-26

## 2024-09-25 RX ORDER — DIAZEPAM 5 MG
5 TABLET ORAL 2 TIMES DAILY PRN
Status: DISCONTINUED | OUTPATIENT
Start: 2024-09-25 | End: 2024-09-30

## 2024-09-25 RX ADMIN — FUROSEMIDE 40 MG: 40 TABLET ORAL at 08:18

## 2024-09-25 RX ADMIN — APIXABAN 5 MG: 5 TABLET, FILM COATED ORAL at 08:18

## 2024-09-25 RX ADMIN — CARVEDILOL 6.25 MG: 6.25 TABLET, FILM COATED ORAL at 08:18

## 2024-09-25 RX ADMIN — IPRATROPIUM BROMIDE AND ALBUTEROL SULFATE 1 DOSE: 2.5; .5 SOLUTION RESPIRATORY (INHALATION) at 07:49

## 2024-09-25 RX ADMIN — WATER 40 MG: 1 INJECTION INTRAMUSCULAR; INTRAVENOUS; SUBCUTANEOUS at 05:51

## 2024-09-25 RX ADMIN — IPRATROPIUM BROMIDE AND ALBUTEROL SULFATE 1 DOSE: 2.5; .5 SOLUTION RESPIRATORY (INHALATION) at 15:06

## 2024-09-25 RX ADMIN — CEFEPIME 2000 MG: 2 INJECTION, POWDER, FOR SOLUTION INTRAVENOUS at 06:01

## 2024-09-25 RX ADMIN — SODIUM CHLORIDE, PRESERVATIVE FREE 10 ML: 5 INJECTION INTRAVENOUS at 20:07

## 2024-09-25 RX ADMIN — DOXYCYCLINE 100 MG: 100 CAPSULE ORAL at 20:06

## 2024-09-25 RX ADMIN — IPRATROPIUM BROMIDE AND ALBUTEROL SULFATE 1 DOSE: 2.5; .5 SOLUTION RESPIRATORY (INHALATION) at 19:34

## 2024-09-25 RX ADMIN — APIXABAN 5 MG: 5 TABLET, FILM COATED ORAL at 20:06

## 2024-09-25 RX ADMIN — SODIUM CHLORIDE 1250 MG: 9 INJECTION, SOLUTION INTRAVENOUS at 06:07

## 2024-09-25 RX ADMIN — IPRATROPIUM BROMIDE AND ALBUTEROL SULFATE 1 DOSE: 2.5; .5 SOLUTION RESPIRATORY (INHALATION) at 10:58

## 2024-09-25 RX ADMIN — WATER 40 MG: 1 INJECTION INTRAMUSCULAR; INTRAVENOUS; SUBCUTANEOUS at 21:47

## 2024-09-25 RX ADMIN — WATER 40 MG: 1 INJECTION INTRAMUSCULAR; INTRAVENOUS; SUBCUTANEOUS at 17:11

## 2024-09-25 RX ADMIN — DIAZEPAM 5 MG: 5 TABLET ORAL at 20:06

## 2024-09-25 RX ADMIN — PANTOPRAZOLE SODIUM 40 MG: 40 TABLET, DELAYED RELEASE ORAL at 05:51

## 2024-09-25 RX ADMIN — ATORVASTATIN CALCIUM 40 MG: 40 TABLET, FILM COATED ORAL at 17:11

## 2024-09-25 RX ADMIN — Medication 3 MG: at 20:06

## 2024-09-25 RX ADMIN — CARVEDILOL 6.25 MG: 6.25 TABLET, FILM COATED ORAL at 20:06

## 2024-09-25 RX ADMIN — DOXYCYCLINE 100 MG: 100 CAPSULE ORAL at 17:11

## 2024-09-25 ASSESSMENT — PAIN SCALES - GENERAL: PAINLEVEL_OUTOF10: 0

## 2024-09-25 NOTE — CARE COORDINATION
Patient does NOT meets LTAC criteria.    Electronically signed by Claudia Mckinnon RN on 9/25/2024 at 3:59 PM

## 2024-09-25 NOTE — PROGRESS NOTES
Physical Therapy  Facility/Department: [facility] ICU  Physical Therapy Initial Assessment    Name: Timothy Kern  : 1950  MRN: [id number]  Date of Service: 2024    Discharge Recommendations:  Patient would benefit from continued therapy after discharge   PT Equipment Recommendations  Equipment Needed:  (CTA)      Patient Diagnosis(es): The primary encounter diagnosis was COPD exacerbation (HCC). Diagnoses of Pneumonia of right lower lobe due to infectious organism and Acute on chronic respiratory failure with hypoxia and hypercapnia (HCC) were also pertinent to this visit.  Past Medical History:  has a past medical history of COPD (chronic obstructive pulmonary disease) (HCC), HTN (hypertension), Hypoxia, On home O2, and Pneumonia.  Past Surgical History:  has no past surgical history on file.    Assessment  Assessment: Pt presents with impaired cognition, safety awareness, balance, and endurance deficits warranting SBA/CGA x1 to complete mobility using RW device for longer ambulatory distances. PT services will benefit pt to progress functional independence and maximize his safety  Treatment Diagnosis: impaired endurance 2* pnuemonia  Therapy Prognosis: Fair  Decision Making: Medium Complexity  Exam: ROM, MMT, Balance, and fcuntional mobility assessments  Requires PT Follow-Up: Yes  Activity Tolerance  Activity Tolerance: Patient limited by endurance  Activity Tolerance Comments: pt c/o SOB post walking activity, SpO2 remains WFL    Plan  Physical Therapy Plan  General Plan: 3-5 times per week  Current Treatment Recommendations: Balance training, Functional mobility training, Transfer training, Endurance training, Gait training, Strengthening, Safety education & training, Patient/Caregiver education & training, Therapeutic activities  Safety Devices  Type of Devices: All fall risk precautions in place, Bed alarm in place, Call light within reach, Gait belt, Patient at risk for falls, Left in bed, Nurse

## 2024-09-25 NOTE — PLAN OF CARE
Problem: Discharge Planning  Goal: Discharge to home or other facility with appropriate resources  9/25/2024 1728 by Tammie Darden, RN  Outcome: Progressing  Flowsheets (Taken 9/25/2024 1728)  Discharge to home or other facility with appropriate resources: Refer to discharge planning if patient needs post-hospital services based on physician order or complex needs related to functional status, cognitive ability or social support system     Problem: Pain  Goal: Verbalizes/displays adequate comfort level or baseline comfort level  9/25/2024 1728 by Tammie Darden, RN  Outcome: Progressing  Flowsheets (Taken 9/25/2024 1728)  Verbalizes/displays adequate comfort level or baseline comfort level:   Assess pain using appropriate pain scale   Implement non-pharmacological measures as appropriate and evaluate response     Problem: Safety - Adult  Goal: Free from fall injury  9/25/2024 1728 by Tammie Darden, RN  Outcome: Progressing  Flowsheets (Taken 9/25/2024 1728)  Free From Fall Injury: Based on caregiver fall risk screen, instruct family/caregiver to ask for assistance with transferring infant if caregiver noted to have fall risk factors     Problem: Skin/Tissue Integrity  Goal: Absence of new skin breakdown  Description: 1.  Monitor for areas of redness and/or skin breakdown  2.  Assess vascular access sites hourly  3.  Every 4-6 hours minimum:  Change oxygen saturation probe site  4.  Every 4-6 hours:  If on nasal continuous positive airway pressure, respiratory therapy assess nares and determine need for appliance change or resting period.  9/25/2024 1728 by Tammie Darden, RN  Outcome: Progressing     Problem: Respiratory - Adult  Goal: Achieves optimal ventilation and oxygenation  9/25/2024 1728 by Tammie Darden, RN  Outcome: Progressing  Flowsheets (Taken 9/25/2024 1728)  Achieves optimal ventilation and oxygenation:   Assess for changes in respiratory status   Assess for changes in mentation and behavior   Position to

## 2024-09-25 NOTE — DISCHARGE INSTR - COC
10/03/24    Intake/Output Summary (Last 24 hours) at 9/25/2024 1513  Last data filed at 9/25/2024 0510  Gross per 24 hour   Intake 741.66 ml   Output 300 ml   Net 441.66 ml     I/O last 3 completed shifts:  In: 741.7 [IV Piggyback:741.7]  Out: 300 [Urine:300]    Safety Concerns:     At Risk for Falls    Impairments/Disabilities:      Muscle weakness, Breathing difficulty    Nutrition Therapy:  Current Nutrition Therapy:   - Oral Diet:  General    Routes of Feeding: Oral  Liquids: No Restrictions  Daily Fluid Restriction: no  Last Modified Barium Swallow with Video (Video Swallowing Test): not done    Treatments at the Time of Hospital Discharge:   Respiratory Treatments: Nasal cannula, Cpap at night  Oxygen Therapy:  is on oxygen at 2-3 L/min per nasal cannula.  Ventilator:    - No ventilator support    Rehab Therapies: Physical Therapy and Occupational Therapy  Weight Bearing Status/Restrictions: No weight bearing restrictions  Other Medical Equipment (for information only, NOT a DME order):    Other Treatments: Skilled Nursing assessment and monitoring. Medication education and monitoring per protocol.      Patient's personal belongings (please select all that are sent with patient):  None    RN SIGNATURE:  Electronically signed by Lurdes Richards RN on 10/3/24 at 2:32 PM EDT    CASE MANAGEMENT/SOCIAL WORK SECTION    Inpatient Status Date:     Readmission Risk Assessment Score:  Readmission Risk              Risk of Unplanned Readmission:  15           Discharging to Facility/ Agency   [address] [place] [address]   P: [phone]  F:     Dialysis Facility (if applicable)   Name:  Address:  Dialysis Schedule:  Phone:  Fax:    / signature: Electronically signed by Kira Hodge RN on 9/25/24 at 3:13 PM EDT    PHYSICIAN SECTION    Prognosis: Good    Condition at Discharge: Stable    Rehab Potential (if transferring to Rehab): Good    Recommended Labs or

## 2024-09-25 NOTE — PLAN OF CARE
Problem: Discharge Planning  Goal: Discharge to home or other facility with appropriate resources  9/25/2024 0426 by Dannie Ceron RN  Outcome: Progressing  Flowsheets  Taken 9/25/2024 0000 by Dannie Ceron RN  Discharge to home or other facility with appropriate resources:   Identify barriers to discharge with patient and caregiver   Arrange for needed discharge resources and transportation as appropriate  Taken 9/24/2024 2000 by Dannie Ceron RN  Discharge to home or other facility with appropriate resources:   Arrange for needed discharge resources and transportation as appropriate   Identify barriers to discharge with patient and caregiver       Problem: Pain  Goal: Verbalizes/displays adequate comfort level or baseline comfort level  9/25/2024 0426 by Dannie Ceron RN  Outcome: Progressing  Flowsheets  Taken 9/25/2024 0000 by Dannie Ceron RN  Verbalizes/displays adequate comfort level or baseline comfort level:   Encourage patient to monitor pain and request assistance   Assess pain using appropriate pain scale  Taken 9/24/2024 2000 by Dannie Ceron RN  Verbalizes/displays adequate comfort level or baseline comfort level:   Encourage patient to monitor pain and request assistance   Assess pain using appropriate pain scale         Problem: Safety - Adult  Goal: Free from fall injury  9/25/2024 0426 by Dannie Ceron RN  Outcome: Progressing  Flowsheets  Taken 9/24/2024 2000 by Dannie Ceron RN  Free From Fall Injury: Instruct family/caregiver on patient safety         Problem: Skin/Tissue Integrity  Goal: Absence of new skin breakdown  Description: 1.  Monitor for areas of redness and/or skin breakdown  2.  Assess vascular access sites hourly  3.  Every 4-6 hours minimum:  Change oxygen saturation probe site  4.  Every 4-6 hours:  If on nasal continuous positive airway pressure, respiratory therapy assess nares and determine need for appliance change or resting period.  9/25/2024 0426  by Dannie Ceron RN       Problem: Respiratory - Adult  Goal: Achieves optimal ventilation and oxygenation  9/25/2024 0426 by Dannie Ceron RN  Outcome: Progressing  Flowsheets  Taken 9/25/2024 0000 by Dannie Ceron RN  Achieves optimal ventilation and oxygenation:   Assess for changes in respiratory status   Assess for changes in mentation and behavior  Taken 9/24/2024 2000 by Dannie Ceron RN  Achieves optimal ventilation and oxygenation:   Assess for changes in respiratory status   Assess for changes in mentation and behavior       Problem: Cardiovascular - Adult  Goal: Maintains optimal cardiac output and hemodynamic stability  9/25/2024 0426 by Dannie Ceron RN  Outcome: Progressing  Flowsheets  Taken 9/25/2024 0000 by Dannie Ceron RN  Maintains optimal cardiac output and hemodynamic stability:   Monitor blood pressure and heart rate   Monitor urine output and notify Licensed Independent Practitioner for values outside of normal range  Taken 9/24/2024 2000 by Dannie Ceron RN  Maintains optimal cardiac output and hemodynamic stability:   Monitor urine output and notify Licensed Independent Practitioner for values outside of normal range   Monitor blood pressure and heart rate

## 2024-09-25 NOTE — PROGRESS NOTES
years.    He had had a previous admission with decompensated congestive heart failure pulmonary edema and small effusions with BNP 2900.  He had a cardioversion performed at that time as well.  Had been on triple drug inhaled therapy in the form of Symbicort/Incruse and Trelegy in the past.    Pulmonary function test unable to perform DLCO, ratio 46%, FEV1 22% / 0.69 L without bronchial reactivity, forced vital capacity 39%, residual volume 224%    Transesophageal echo June 2021 showed normal ventricular size and function, mild MR    Previous bicarbonate was 37 in 2023    Echo February 2023 shows ejection fraction 50 to 55%, indeterminate left ventricular function, right ventricle mildly dilated with reduced systolic function, RVSP 41, mild MR and mild TR    CT chest from February 2023 showed a spiculated nodule in the left upper lobe measuring 1.7 cm PET scan or biopsy recommended with slightly increased size ascending thoracic aortic aneurysm measuring 4.3 cm    6-minute walk patient ambulated on room air desaturating to 83% applied 3 L of oxygen ambulated 420 feet still saturating 86%.  Required 4 L of oxygen with ambulation.    Due to lung nodule noted last year I will get a CT chest without contrast now.      Electronically signed by Nabeel Mcguire MD on 09/25/24     This progress note was completed using a voice transcription system. Every effort was made to ensure accuracy. However, inadvertent computerized transcription errors may be present.    Nabeel Mcguire MD  Pulmonary and Critical Care   [phone] [employer]  [phone] Cell

## 2024-09-25 NOTE — PROGRESS NOTES
Writer notified NP that patient has not urinated during shift. Orders placed for bladder scan and straight cath if >400.

## 2024-09-25 NOTE — PROGRESS NOTES
[facility]   Occupational Therapy Evaluation  Date: 24  Patient Name: Timothy Kern       Room:   MRN: [id number]  Account: [id number]   : 1950  (74 y.o.) Gender: male     Discharge Recommendations:  Further Occupational Therapy is recommended upon facility discharge.    OT Equipment Recommendations  Other: TBD    Referring Practitioner: Franklin Herzog MD  Diagnosis: COPD exacerbation        Treatment Diagnosis: impaired selfcare status    Past Medical History:  has a past medical history of COPD (chronic obstructive pulmonary disease) (HCC), HTN (hypertension), Hypoxia, On home O2, and Pneumonia.    Past Surgical History:   has no past surgical history on file.    Restrictions  Restrictions/Precautions  Restrictions/Precautions: General Precautions, Contact Precautions, Fall Risk (MRSA)  Required Braces or Orthoses?: No  Implants present? :  (patient denies)  Position Activity Restriction  Other position/activity restrictions: up with assist, impulsive, monitor SP02 PRN- on 3L      Vitals  Vitals  O2 Device: Nasal cannula  Comment: patient able to maintain >90% throughout session on 3L 02     Subjective  Subjective: \"I just moved to the [facility], I lived in [place]\" Patient plesant and agreeable for OT/PT eval, but questionable historian.  Comments: Ok per PRN Tammie for OT/PT eval  Subjective  Pain: \"always in my right side of my stomach\" rating  7-8/10 pain after eating. At rest currently rating 4/10      Social/Functional History  Social/Functional History  Lives With: Alone  Type of Home: Facility ([facility])  Home Layout: One level  Home Access: Level entry  Home Equipment: Wheelchair - Manual (facility's WC)  ADL Assistance: Needs assistance (patient reporting independent with toileting and dressing, but recieving assistance from staff with showers)  Homemaking Assistance: Needs assistance (IADL completed by facility staff)  Homemaking Responsibilities:

## 2024-09-25 NOTE — PLAN OF CARE
Problem: Discharge Planning  Goal: Discharge to home or other facility with appropriate resources  9/25/2024 0511 by Dannie Ceron RN  Outcome: Progressing  9/25/2024 0426 by Dannie Ceron RN  Outcome: Progressing  Flowsheets  Taken 9/25/2024 0000  Discharge to home or other facility with appropriate resources:   Identify barriers to discharge with patient and caregiver   Arrange for needed discharge resources and transportation as appropriate  Taken 9/24/2024 2000  Discharge to home or other facility with appropriate resources:   Arrange for needed discharge resources and transportation as appropriate   Identify barriers to discharge with patient and caregiver    Problem: Pain  Goal: Verbalizes/displays adequate comfort level or baseline comfort level  9/25/2024 0511 by Dannie Ceron RN  Outcome: Progressing  9/25/2024 0426 by Dannie Ceron RN  Outcome: Progressing  Flowsheets  Taken 9/25/2024 0000  Verbalizes/displays adequate comfort level or baseline comfort level:   Encourage patient to monitor pain and request assistance   Assess pain using appropriate pain scale  Taken 9/24/2024 2000  Verbalizes/displays adequate comfort level or baseline comfort level:   Encourage patient to monitor pain and request assistance   Assess pain using appropriate pain scale    Problem: Safety - Adult  Goal: Free from fall injury  9/25/2024 0511 by Dannie Ceron RN  Outcome: Progressing  9/25/2024 0426 by Dannie Ceron RN  Outcome: Progressing  Flowsheets (Taken 9/24/2024 2000)  Free From Fall Injury: Instruct family/caregiver on patient safety       Problem: Skin/Tissue Integrity  Goal: Absence of new skin breakdown  Description: 1.  Monitor for areas of redness and/or skin breakdown  2.  Assess vascular access sites hourly  3.  Every 4-6 hours minimum:  Change oxygen saturation probe site  4.  Every 4-6 hours:  If on nasal continuous positive airway pressure, respiratory therapy assess nares and determine  need for appliance change or resting period.  9/25/2024 0511 by Dannie Ceron RN  Outcome: Progressing  9/25/2024 0426 by Dannie Ceron RN  Outcome: Progressing       Problem: Respiratory - Adult  Goal: Achieves optimal ventilation and oxygenation  9/25/2024 0511 by Dannie Ceron RN  Outcome: Progressing  9/25/2024 0426 by Dannie Ceron RN  Outcome: Progressing  Flowsheets  Taken 9/25/2024 0000  Achieves optimal ventilation and oxygenation:   Assess for changes in respiratory status   Assess for changes in mentation and behavior  Taken 9/24/2024 2000  Achieves optimal ventilation and oxygenation:   Assess for changes in respiratory status   Assess for changes in mentation and behavior       Problem: Cardiovascular - Adult  Goal: Maintains optimal cardiac output and hemodynamic stability  9/25/2024 0511 by Dannie Ceron RN  Outcome: Progressing  9/25/2024 0426 by Dannie Ceron RN  Outcome: Progressing  Flowsheets  Taken 9/25/2024 0000  Maintains optimal cardiac output and hemodynamic stability:   Monitor blood pressure and heart rate   Monitor urine output and notify Licensed Independent Practitioner for values outside of normal range  Taken 9/24/2024 2000  Maintains optimal cardiac output and hemodynamic stability:   Monitor urine output and notify Licensed Independent Practitioner for values outside of normal range   Monitor blood pressure and heart rate       Problem: Skin/Tissue Integrity - Adult  Goal: Skin integrity remains intact  Outcome: Progressing  Flowsheets  Taken 9/25/2024 0000  Skin Integrity Remains Intact:   Monitor for areas of redness and/or skin breakdown   Assess vascular access sites hourly  Taken 9/24/2024 2000  Skin Integrity Remains Intact:   Monitor for areas of redness and/or skin breakdown   Assess vascular access sites hourly  Goal: Incisions, wounds, or drain sites healing without S/S of infection  Outcome: Progressing     Problem: Gastrointestinal - Adult  Goal:

## 2024-09-25 NOTE — CARE COORDINATION
Case Management Assessment  Initial Evaluation    Date/Time of Evaluation: 9/25/2024 3:11 PM  Assessment Completed by: Kira Hodge RN    If patient is discharged prior to next notation, then this note serves as note for discharge by case management.    Patient Name: Timothy Kern                   YOB: 1950  Diagnosis: COPD exacerbation (HCC) [J44.1]  Pneumonia of right lower lobe due to infectious organism [J18.9]  Acute on chronic respiratory failure with hypoxia and hypercapnia (HCC) [J96.21, J96.22]  CAP (community acquired pneumonia) due to Chlamydia species [J16.0]                   Date / Time: 9/24/2024 12:33 PM    Patient Admission Status: Inpatient   Readmission Risk (Low < 19, Mod (19-27), High > 27): Readmission Risk Score: 13.4    Current PCP: No primary care provider on file.  PCP verified by CM?  (States, was seeing a Eduardo Prado in [place])    Chart Reviewed: Yes      History Provided by: Patient  Patient Orientation: Alert and Oriented    Patient Cognition: Alert    Hospitalization in the last 30 days (Readmission):  No    If yes, Readmission Assessment in CM Navigator will be completed.    Advance Directives:      Code Status: DNR-CCA   Patient's Primary Decision Maker is: Legal Next of Kin      Discharge Planning:    Patient lives with: Other (Comment) ([facility]) Type of Home: Skilled Nursing Facility  Primary Care Giver: Other (Comment) ([facility])  Patient Support Systems include: Family Members   Current Financial resources: Medicare, Medicaid  Current community resources:    Current services prior to admission: Skilled Nursing Facility            Current DME:              Type of Home Care services:  Skilled Therapy    ADLS  Prior functional level:    Current functional level:      PT AM-PAC:   /24  OT AM-PAC:   /24    Family can provide assistance at DC: No  Would you like Case Management to discuss the discharge plan with any other family

## 2024-09-25 NOTE — PROGRESS NOTES
Patient and his daughter did mention that he drinks two beers a night at [employer]. Resident team and night shift NP updated.

## 2024-09-25 NOTE — PROGRESS NOTES
[facility]  IN-PATIENT SERVICE  [facility]    PROGRESS NOTE             9/25/2024    8:01 AM    Name:   Timothy Kern  MRN:     [id number]     Acct:      [id number]   Room:   2006/2006-01   Day:  1  Admit Date:  9/24/2024 12:33 PM    PCP:  No primary care provider on file.  Code Status:  DNR-CCA    Subjective:     C/C:   Chief Complaint   Patient presents with    Shortness of Breath     Interval History Status: not changed.    The patient was seen and examined at bedside.  Vitally stable.  On BiPAP with FiO2 of 30%.     On labs, pneumonia workup is negative so far.  MRSA positive.  Cefepime and vancomycin discontinued.  Added doxycycline.    ABG showed respiratory alkalosis, with pCO2 of 58, bicarb 46.4.    Repeat chest x-ray showed stable nonspecific airspace opacities in the right upper and lower lobes.  Abdominal x-rays showed surgical clips within the right upper quadrant.  Patient does not remember any specific information about abdominal surgical history.    Brief History:     The patient is a 74 y.o.  Unavailable / unknown male who presents withShortness of Breath   and he is admitted to the hospital for the management of COPD exacerbation.     He has a past medical history of COPD (on 3 L of oxygen at home), atrial fibrillation (on Eliquis), and congestive heart failure.  He was living in [place] and then moved to [place].  We do not have any medical records for him.  Today the patient was transferred from [facility] for shortness of breath.  Since the last 2 weeks, his breathing got worse at nursing facility.  There is no history of fever, chills, chest pain.  On examination, sounds were absent on the right side, diffuse wheezing on the left side.     On presentation, he was severely tachypneic (respiratory rate 34) and hypoxic.  EMS put him on CPAP, which helped him.  He does have a history of leg swelling, but there was no swelling on

## 2024-09-26 LAB
ANION GAP SERPL CALCULATED.3IONS-SCNC: 6 MMOL/L (ref 9–17)
BASOPHILS # BLD: 0 K/UL (ref 0–0.2)
BASOPHILS NFR BLD: 0 % (ref 0–2)
BUN SERPL-MCNC: 28 MG/DL (ref 8–23)
CALCIUM SERPL-MCNC: 9 MG/DL (ref 8.6–10.4)
CHLORIDE SERPL-SCNC: 91 MMOL/L (ref 98–107)
CO2 SERPL-SCNC: 41 MMOL/L (ref 20–31)
CREAT SERPL-MCNC: 0.7 MG/DL (ref 0.7–1.2)
EOSINOPHIL # BLD: 0.27 K/UL (ref 0–0.4)
EOSINOPHILS RELATIVE PERCENT: 2 % (ref 0–4)
ERYTHROCYTE [DISTWIDTH] IN BLOOD BY AUTOMATED COUNT: 16.3 % (ref 11.5–14.9)
GFR, ESTIMATED: >90 ML/MIN/1.73M2
GLUCOSE SERPL-MCNC: 160 MG/DL (ref 70–99)
HCT VFR BLD AUTO: 25.1 % (ref 41–53)
HGB BLD-MCNC: 8.3 G/DL (ref 13.5–17.5)
LYMPHOCYTES NFR BLD: 0.69 K/UL (ref 1–4.8)
LYMPHOCYTES RELATIVE PERCENT: 5 % (ref 24–44)
MCH RBC QN AUTO: 30.1 PG (ref 26–34)
MCHC RBC AUTO-ENTMCNC: 33.1 G/DL (ref 31–37)
MCV RBC AUTO: 91 FL (ref 80–100)
MONOCYTES NFR BLD: 0.41 K/UL (ref 0.1–1.3)
MONOCYTES NFR BLD: 3 % (ref 1–7)
MORPHOLOGY: ABNORMAL
MORPHOLOGY: ABNORMAL
NEUTROPHILS NFR BLD: 90 % (ref 36–66)
NEUTS SEG NFR BLD: 12.33 K/UL (ref 1.3–9.1)
PLATELET # BLD AUTO: 278 K/UL (ref 150–450)
PMV BLD AUTO: 8.3 FL (ref 6–12)
POTASSIUM SERPL-SCNC: 4.7 MMOL/L (ref 3.7–5.3)
PROCALCITONIN SERPL-MCNC: 0.05 NG/ML
RBC # BLD AUTO: 2.75 M/UL (ref 4.5–5.9)
SODIUM SERPL-SCNC: 138 MMOL/L (ref 135–144)
WBC OTHER # BLD: 13.7 K/UL (ref 3.5–11)

## 2024-09-26 PROCEDURE — 6370000000 HC RX 637 (ALT 250 FOR IP)

## 2024-09-26 PROCEDURE — 2580000003 HC RX 258: Performed by: INTERNAL MEDICINE

## 2024-09-26 PROCEDURE — 94761 N-INVAS EAR/PLS OXIMETRY MLT: CPT

## 2024-09-26 PROCEDURE — 2580000003 HC RX 258

## 2024-09-26 PROCEDURE — 99233 SBSQ HOSP IP/OBS HIGH 50: CPT | Performed by: INTERNAL MEDICINE

## 2024-09-26 PROCEDURE — 84145 PROCALCITONIN (PCT): CPT

## 2024-09-26 PROCEDURE — 6370000000 HC RX 637 (ALT 250 FOR IP): Performed by: INTERNAL MEDICINE

## 2024-09-26 PROCEDURE — 85025 COMPLETE CBC W/AUTO DIFF WBC: CPT

## 2024-09-26 PROCEDURE — 94640 AIRWAY INHALATION TREATMENT: CPT

## 2024-09-26 PROCEDURE — 6360000002 HC RX W HCPCS: Performed by: INTERNAL MEDICINE

## 2024-09-26 PROCEDURE — 80048 BASIC METABOLIC PNL TOTAL CA: CPT

## 2024-09-26 PROCEDURE — 2060000000 HC ICU INTERMEDIATE R&B

## 2024-09-26 PROCEDURE — 36415 COLL VENOUS BLD VENIPUNCTURE: CPT

## 2024-09-26 PROCEDURE — 2700000000 HC OXYGEN THERAPY PER DAY

## 2024-09-26 PROCEDURE — 94660 CPAP INITIATION&MGMT: CPT

## 2024-09-26 RX ADMIN — SODIUM CHLORIDE, PRESERVATIVE FREE 10 ML: 5 INJECTION INTRAVENOUS at 09:28

## 2024-09-26 RX ADMIN — WATER 40 MG: 1 INJECTION INTRAMUSCULAR; INTRAVENOUS; SUBCUTANEOUS at 05:58

## 2024-09-26 RX ADMIN — IPRATROPIUM BROMIDE AND ALBUTEROL SULFATE 1 DOSE: 2.5; .5 SOLUTION RESPIRATORY (INHALATION) at 15:01

## 2024-09-26 RX ADMIN — CARVEDILOL 6.25 MG: 6.25 TABLET, FILM COATED ORAL at 19:40

## 2024-09-26 RX ADMIN — FUROSEMIDE 40 MG: 40 TABLET ORAL at 09:27

## 2024-09-26 RX ADMIN — DOXYCYCLINE 100 MG: 100 CAPSULE ORAL at 19:40

## 2024-09-26 RX ADMIN — DIAZEPAM 5 MG: 5 TABLET ORAL at 09:27

## 2024-09-26 RX ADMIN — WATER 40 MG: 1 INJECTION INTRAMUSCULAR; INTRAVENOUS; SUBCUTANEOUS at 15:34

## 2024-09-26 RX ADMIN — PANTOPRAZOLE SODIUM 40 MG: 40 TABLET, DELAYED RELEASE ORAL at 05:58

## 2024-09-26 RX ADMIN — APIXABAN 5 MG: 5 TABLET, FILM COATED ORAL at 19:40

## 2024-09-26 RX ADMIN — IPRATROPIUM BROMIDE AND ALBUTEROL SULFATE 1 DOSE: 2.5; .5 SOLUTION RESPIRATORY (INHALATION) at 20:09

## 2024-09-26 RX ADMIN — APIXABAN 5 MG: 5 TABLET, FILM COATED ORAL at 09:27

## 2024-09-26 RX ADMIN — IPRATROPIUM BROMIDE AND ALBUTEROL SULFATE 1 DOSE: 2.5; .5 SOLUTION RESPIRATORY (INHALATION) at 11:10

## 2024-09-26 RX ADMIN — SODIUM CHLORIDE, PRESERVATIVE FREE 10 ML: 5 INJECTION INTRAVENOUS at 21:53

## 2024-09-26 RX ADMIN — IPRATROPIUM BROMIDE AND ALBUTEROL SULFATE 1 DOSE: 2.5; .5 SOLUTION RESPIRATORY (INHALATION) at 07:20

## 2024-09-26 RX ADMIN — ATORVASTATIN CALCIUM 40 MG: 40 TABLET, FILM COATED ORAL at 19:41

## 2024-09-26 RX ADMIN — DIAZEPAM 5 MG: 5 TABLET ORAL at 19:45

## 2024-09-26 RX ADMIN — Medication 3 MG: at 19:40

## 2024-09-26 RX ADMIN — CARVEDILOL 6.25 MG: 6.25 TABLET, FILM COATED ORAL at 09:27

## 2024-09-26 RX ADMIN — WATER 40 MG: 1 INJECTION INTRAMUSCULAR; INTRAVENOUS; SUBCUTANEOUS at 21:51

## 2024-09-26 RX ADMIN — DOXYCYCLINE 100 MG: 100 CAPSULE ORAL at 09:27

## 2024-09-26 ASSESSMENT — PAIN SCALES - GENERAL
PAINLEVEL_OUTOF10: 0
PAINLEVEL_OUTOF10: 0

## 2024-09-26 NOTE — PLAN OF CARE
Problem: Discharge Planning  Goal: Discharge to home or other facility with appropriate resources  9/26/2024 0439 by Dannie Ceron RN  Outcome: Progressing  Flowsheets (Taken 9/25/2024 2000)  Discharge to home or other facility with appropriate resources:   Identify barriers to discharge with patient and caregiver   Arrange for needed discharge resources and transportation as appropriate       Problem: Pain  Goal: Verbalizes/displays adequate comfort level or baseline comfort level  9/26/2024 0439 by Dannie Ceron RN  Outcome: Progressing  Flowsheets (Taken 9/25/2024 2000)  Verbalizes/displays adequate comfort level or baseline comfort level:   Encourage patient to monitor pain and request assistance   Assess pain using appropriate pain scale       Problem: Safety - Adult  Goal: Free from fall injury  9/26/2024 0439 by Dannie Ceron RN  Outcome: Progressing  Flowsheets (Taken 9/25/2024 2000)  Free From Fall Injury: Instruct family/caregiver on patient safety       Problem: Skin/Tissue Integrity  Goal: Absence of new skin breakdown  Description: 1.  Monitor for areas of redness and/or skin breakdown  2.  Assess vascular access sites hourly  3.  Every 4-6 hours minimum:  Change oxygen saturation probe site  4.  Every 4-6 hours:  If on nasal continuous positive airway pressure, respiratory therapy assess nares and determine need for appliance change or resting period.  9/26/2024 0439 by Dannie Ceron RN  Outcome: Progressing       Problem: Respiratory - Adult  Goal: Achieves optimal ventilation and oxygenation  9/26/2024 0439 by Dannie Ceron RN  Outcome: Progressing  Flowsheets (Taken 9/25/2024 2000)  Achieves optimal ventilation and oxygenation:   Assess for changes in mentation and behavior   Assess for changes in respiratory status       Problem: Cardiovascular - Adult  Goal: Maintains optimal cardiac output and hemodynamic stability  9/26/2024 0439 by Dannie Ceron, RN  Outcome:

## 2024-09-26 NOTE — PROGRESS NOTES
[facility] PULMONARY,CRITICAL CARE & SLEEP   Benjiecindy Pardo MD/Giorgio HOLLINGSWORTH AGAMAGYP-BC, NP-C       Abby HOLLINGSWORTH NP-C      Félix HOLLINGSWORTH NP-C                                  Pulmonary Critical Care Progress Note    Patient - Timothy Kern   Age - 74 y.o.   - 1950  MRN - [id number]  [id number]  Date of Admission - 2024 12:33 PM    Consulting Service/Physician:       Primary Care Physician: No primary care provider on file.    SUBJECTIVE:     Chief Complaint:   Chief Complaint   Patient presents with    Shortness of Breath   COPD exacerbation  Subjective:    Patient feeling a little better.  Still short of breath with exertion or with conversation.  He denies chest pain, hemoptysis fever or chills.  He did have a CT scan performed.  I reviewed the results myself.  I see multiple nodular densities and scars bilateral superimposed on emphysema.  I do not see anything overtly concerning for malignancy on my review.  He does have an enlarged subcarinal lymph node that is likely reactive.    I discussed the findings of the CT scan with him.  For now I would not recommend a biopsy but rather a follow-up CT scan in 4 months.  This could be performed without contrast.    VITALS  BP (!) 158/81   Pulse 94   Temp 98.5 °F (36.9 °C) (Axillary)   Resp 18   Ht 1.753 m (5' 9\")   Wt 72.4 kg (159 lb 9.8 oz)   SpO2 97%   BMI 23.57 kg/m²   Wt Readings from Last 3 Encounters:   24 72.4 kg (159 lb 9.8 oz)     I/O (24 Hours)    Intake/Output Summary (Last 24 hours) at 2024 1213  Last data filed at 2024 0800  Gross per 24 hour   Intake --   Output 700 ml   Net -700 ml     Ventilator:   Settings  FiO2 : 30 %  Insp Rise Time (%): 1 %  Exam:   Physical Exam   Constitutional: Alert and cooperative in no distress  HENT: 2L nasal cannula saturating 99%  Neck: Neck supple.   Cardiovascular:  Regular rate and rhythm.  Normal heart tones.  No JVD.

## 2024-09-26 NOTE — PROGRESS NOTES
[facility]  IN-PATIENT SERVICE  [facility]    PROGRESS NOTE             9/26/2024    9:29 AM    Name:   Timothy Kern  MRN:     [id number]     Acct:      [id number]   Room:   2006/2006-01   Day:  2  Admit Date:  9/24/2024 12:33 PM    PCP:  No primary care provider on file.  Code Status:  DNR-CCA    Subjective:     C/C:   Chief Complaint   Patient presents with    Shortness of Breath     Interval History Status: not changed.    The patient was seen and examined at bedside.  Vitally stable.  On nasal cannula 3 L.  WBC count is trending up 13.7, CT showed 2.2 cm of the lung nodules with bibasilar nodular opacity, 4.2 cm ascending aortic aneurysm.  Vitamin B12 and folate are in normal range.  Iron studies shows iron deficiency anemia.    He is doing much better.  He was sitting at bedside eating his breakfast and speaking in complete sentences.    Brief History:     The patient is a 74 y.o.  Unavailable / unknown male who presents withShortness of Breath   and he is admitted to the hospital for the management of COPD exacerbation.     He has a past medical history of COPD (on 3 L of oxygen at home), atrial fibrillation (on Eliquis), and congestive heart failure.  He was living in [place] and then moved to [place].  We do not have any medical records for him.  Today the patient was transferred from [facility] for shortness of breath.  Since the last 2 weeks, his breathing got worse at nursing facility.  There is no history of fever, chills, chest pain.  On examination, sounds were absent on the right side, diffuse wheezing on the left side.     On presentation, he was severely tachypneic (respiratory rate 34) and hypoxic.  EMS put him on CPAP, which helped him.  He does have a history of leg swelling, but there was no swelling on presentation.     ABG showed chronic CO2 retention.  ABGs showed chronic CO2 retention, pCO2 is 95, bicarb 52.5, almost normal pH.

## 2024-09-26 NOTE — PROGRESS NOTES
Spiritual Health History and Assessment/Progress Note  [facility]    Initial Encounter, Spiritual/Emotional Needs,  ,  ,      Name: Timothy Kern MRN: [id number]    Age: 74 y.o.     Sex: male   Language: English   Mormon: Unknown   CAP (community acquired pneumonia) due to Chlamydia species     Date: 9/26/2024            Total Time Calculated: 20 min              Spiritual Assessment began in [facility] ICU        Referral/Consult From: Rounding   Encounter Overview/Reason: Initial Encounter, Spiritual/Emotional Needs  Service Provided For: Patient    Risa, Belief, Meaning:   Patient identifies as spiritual  Family/Friends No family/friends present      Importance and Influence:  Patient has spiritual/personal beliefs that influence decisions regarding their health  Family/Friends No family/friends present    Community:  Patient expresses feelings of isolation: disconnected from family/friends and feeling there is no one to turn to for help  Family/Friends No family/friends present    Assessment and Plan of Care:     Patient Interventions include: Provided sacramental/Mormonism ritual  Family/Friends Interventions include: No family/friends present    Patient Plan of Care: Spiritual Care available upon further referral  Family/Friends Plan of Care: No family/friends present    Electronically signed by Chaplain Timbo on 9/26/2024 at 4:09 PM       09/26/24 1606   Encounter Summary   Encounter Overview/Reason Initial Encounter;Spiritual/Emotional Needs   Service Provided For Patient   Referral/Consult From Rounding   Support System Unknown;Other (Comment)  (Patient hard to understand)   Last Encounter  09/26/24   Complexity of Encounter Low   Begin Time 1545   End Time  1605   Total Time Calculated 20 min   Spiritual/Emotional needs   Type Spiritual Support;Emotional Distress   Assessment/Intervention/Outcome   Assessment Complicated grieving;Desire for reconciliation;Moral distress   Intervention

## 2024-09-26 NOTE — PROGRESS NOTES
Occupational Therapy  [facility]   OCCUPATIONAL THERAPY MISSED TREATMENT NOTE   INPATIENT   Date: 24  Patient Name: Timothy Kern       Room:   MRN: [id number]   Account #: [id number]    : 1950  (74 y.o.)  Gender: male   Referring Practitioner: Franklin Herzog MD  Diagnosis: COPD exacerbation             REASON FOR MISSED TREATMENT:  Patient declined   -    pt lying in bed upon arrival. After writer introduces self pt states \"Not today. I'm pretty tired and I don't feel good.\" Will continue to follow for OT needs.       Electronically signed by ELIESER Beard on 24 at 3:32 PM EDT

## 2024-09-26 NOTE — CARE COORDINATION
DISCHARGE PLANNING NOTE:    LSW following for potential SNF placement. Patient is from [facility], per son Melvin, family does not wish for patient to return. Writer to leave FOC at bedside for son to review when visiting later this evening. Melvin will provide choice once list has been reviewed. Patient will need a new authorization once patient has been accepted at a new facility.

## 2024-09-26 NOTE — PLAN OF CARE
Problem: Discharge Planning  Goal: Discharge to home or other facility with appropriate resources  9/26/2024 1748 by Tammie Darden, RN  Outcome: Progressing  Flowsheets (Taken 9/26/2024 1748)  Discharge to home or other facility with appropriate resources: Refer to discharge planning if patient needs post-hospital services based on physician order or complex needs related to functional status, cognitive ability or social support system     Problem: Pain  Goal: Verbalizes/displays adequate comfort level or baseline comfort level  9/26/2024 1748 by Tammie Darden, RN  Outcome: Progressing  Flowsheets (Taken 9/26/2024 1748)  Verbalizes/displays adequate comfort level or baseline comfort level:   Encourage patient to monitor pain and request assistance   Implement non-pharmacological measures as appropriate and evaluate response     Problem: Safety - Adult  Goal: Free from fall injury  9/26/2024 1748 by Tammie Darden, RN  Outcome: Progressing  Flowsheets (Taken 9/26/2024 1748)  Free From Fall Injury: Based on caregiver fall risk screen, instruct family/caregiver to ask for assistance with transferring infant if caregiver noted to have fall risk factors     Problem: Skin/Tissue Integrity  Goal: Absence of new skin breakdown  Description: 1.  Monitor for areas of redness and/or skin breakdown  2.  Assess vascular access sites hourly  3.  Every 4-6 hours minimum:  Change oxygen saturation probe site  4.  Every 4-6 hours:  If on nasal continuous positive airway pressure, respiratory therapy assess nares and determine need for appliance change or resting period.  9/26/2024 1748 by Tammie Darden, RN  Outcome: Progressing     Problem: Respiratory - Adult  Goal: Achieves optimal ventilation and oxygenation  9/26/2024 1748 by Tammie Darden, RN  Outcome: Progressing  Flowsheets (Taken 9/26/2024 1748)  Achieves optimal ventilation and oxygenation:   Assess for changes in respiratory status   Assess for changes in mentation and  behavior   Position to facilitate oxygenation and minimize respiratory effort     Problem: Cardiovascular - Adult  Goal: Maintains optimal cardiac output and hemodynamic stability  9/26/2024 1748 by Tammie Darden RN  Outcome: Progressing  Flowsheets (Taken 9/26/2024 1748)  Maintains optimal cardiac output and hemodynamic stability:   Monitor blood pressure and heart rate   Assess for signs of decreased cardiac output     Problem: Skin/Tissue Integrity - Adult  Goal: Skin integrity remains intact  9/26/2024 1748 by Tammie Darden RN  Outcome: Progressing  Flowsheets (Taken 9/26/2024 1748)  Skin Integrity Remains Intact: Monitor for areas of redness and/or skin breakdown     Problem: Skin/Tissue Integrity - Adult  Goal: Incisions, wounds, or drain sites healing without S/S of infection  9/26/2024 1748 by Tammie Darden, RN  Outcome: Progressing  Flowsheets (Taken 9/26/2024 1748)  Incisions, Wounds, or Drain Sites Healing Without Sign and Symptoms of Infection: ADMISSION and DAILY: Assess and document risk factors for pressure ulcer development     Problem: Gastrointestinal - Adult  Goal: Minimal or absence of nausea and vomiting  9/26/2024 1748 by Tammie Darden, RN  Outcome: Progressing  Flowsheets (Taken 9/26/2024 1748)  Minimal or absence of nausea and vomiting: Provide nonpharmacologic comfort measures as appropriate     Problem: Gastrointestinal - Adult  Goal: Maintains or returns to baseline bowel function  9/26/2024 1748 by Tammie Darden, RN  Outcome: Progressing  Flowsheets (Taken 9/26/2024 1748)  Maintains or returns to baseline bowel function: Administer ordered medications as needed     Problem: ABCDS Injury Assessment  Goal: Absence of physical injury  9/26/2024 1748 by Tammie Darden RN  Outcome: Progressing  Flowsheets (Taken 9/26/2024 1748)  Absence of Physical Injury: Implement safety measures based on patient assessment

## 2024-09-26 NOTE — PROGRESS NOTES
Physical Therapy        Physical Therapy Cancel Note      DATE: 2024    NAME: Timothy Kern  MRN: [id number]   : 1950      Patient not seen this date for Physical Therapy due to:    Patient Declined:   Patient declined due to increased fatigue this date.  Reports poor endurance.  Writer educated on circulation ex. and self repositioning PRN.   And informed Tammie LAMAR of decline.      Electronically signed by Rosa Medrano PTA on 2024 at 11:29 AM

## 2024-09-27 ENCOUNTER — APPOINTMENT (OUTPATIENT)
Dept: ULTRASOUND IMAGING | Age: 74
DRG: 189 | End: 2024-09-27
Payer: MEDICARE

## 2024-09-27 LAB
ANION GAP SERPL CALCULATED.3IONS-SCNC: 3 MMOL/L (ref 9–17)
BASOPHILS # BLD: 0 K/UL (ref 0–0.2)
BASOPHILS NFR BLD: 0 % (ref 0–2)
BNP SERPL-MCNC: 337 PG/ML
BUN SERPL-MCNC: 35 MG/DL (ref 8–23)
CALCIUM SERPL-MCNC: 9 MG/DL (ref 8.6–10.4)
CHLORIDE SERPL-SCNC: 91 MMOL/L (ref 98–107)
CO2 SERPL-SCNC: 45 MMOL/L (ref 20–31)
CREAT SERPL-MCNC: 0.8 MG/DL (ref 0.7–1.2)
EOSINOPHIL # BLD: 0 K/UL (ref 0–0.4)
EOSINOPHILS RELATIVE PERCENT: 0 % (ref 0–4)
ERYTHROCYTE [DISTWIDTH] IN BLOOD BY AUTOMATED COUNT: 15.9 % (ref 11.5–14.9)
GFR, ESTIMATED: >90 ML/MIN/1.73M2
GLUCOSE SERPL-MCNC: 175 MG/DL (ref 70–99)
HCT VFR BLD AUTO: 25.2 % (ref 41–53)
HGB BLD-MCNC: 8.3 G/DL (ref 13.5–17.5)
LYMPHOCYTES NFR BLD: 0.37 K/UL (ref 1–4.8)
LYMPHOCYTES RELATIVE PERCENT: 3 % (ref 24–44)
MCH RBC QN AUTO: 29.8 PG (ref 26–34)
MCHC RBC AUTO-ENTMCNC: 32.8 G/DL (ref 31–37)
MCV RBC AUTO: 90.8 FL (ref 80–100)
MONOCYTES NFR BLD: 0.25 K/UL (ref 0.1–1.3)
MONOCYTES NFR BLD: 2 % (ref 1–7)
MORPHOLOGY: ABNORMAL
NEUTROPHILS NFR BLD: 95 % (ref 36–66)
NEUTS SEG NFR BLD: 11.78 K/UL (ref 1.3–9.1)
PLATELET # BLD AUTO: 264 K/UL (ref 150–450)
PMV BLD AUTO: 8.4 FL (ref 6–12)
POTASSIUM SERPL-SCNC: 4.9 MMOL/L (ref 3.7–5.3)
RBC # BLD AUTO: 2.78 M/UL (ref 4.5–5.9)
SODIUM SERPL-SCNC: 139 MMOL/L (ref 135–144)
WBC OTHER # BLD: 12.4 K/UL (ref 3.5–11)

## 2024-09-27 PROCEDURE — 6370000000 HC RX 637 (ALT 250 FOR IP)

## 2024-09-27 PROCEDURE — 36415 COLL VENOUS BLD VENIPUNCTURE: CPT

## 2024-09-27 PROCEDURE — 94640 AIRWAY INHALATION TREATMENT: CPT

## 2024-09-27 PROCEDURE — 97110 THERAPEUTIC EXERCISES: CPT

## 2024-09-27 PROCEDURE — 94761 N-INVAS EAR/PLS OXIMETRY MLT: CPT

## 2024-09-27 PROCEDURE — 2060000000 HC ICU INTERMEDIATE R&B

## 2024-09-27 PROCEDURE — 76705 ECHO EXAM OF ABDOMEN: CPT

## 2024-09-27 PROCEDURE — 2580000003 HC RX 258

## 2024-09-27 PROCEDURE — 6360000002 HC RX W HCPCS: Performed by: INTERNAL MEDICINE

## 2024-09-27 PROCEDURE — 85025 COMPLETE CBC W/AUTO DIFF WBC: CPT

## 2024-09-27 PROCEDURE — 94660 CPAP INITIATION&MGMT: CPT

## 2024-09-27 PROCEDURE — 83880 ASSAY OF NATRIURETIC PEPTIDE: CPT

## 2024-09-27 PROCEDURE — 2700000000 HC OXYGEN THERAPY PER DAY

## 2024-09-27 PROCEDURE — 2580000003 HC RX 258: Performed by: INTERNAL MEDICINE

## 2024-09-27 PROCEDURE — 99233 SBSQ HOSP IP/OBS HIGH 50: CPT | Performed by: INTERNAL MEDICINE

## 2024-09-27 PROCEDURE — 80048 BASIC METABOLIC PNL TOTAL CA: CPT

## 2024-09-27 PROCEDURE — 97116 GAIT TRAINING THERAPY: CPT

## 2024-09-27 PROCEDURE — 6370000000 HC RX 637 (ALT 250 FOR IP): Performed by: INTERNAL MEDICINE

## 2024-09-27 RX ORDER — SODIUM FERRIC GLUCONATE COMPLEX IN SUCROSE 12.5 MG/ML
250 INJECTION INTRAVENOUS DAILY
Status: DISCONTINUED | OUTPATIENT
Start: 2024-09-27 | End: 2024-09-27 | Stop reason: DRUGHIGH

## 2024-09-27 RX ORDER — FUROSEMIDE 20 MG
20 TABLET ORAL EVERY MORNING
Status: DISCONTINUED | OUTPATIENT
Start: 2024-09-28 | End: 2024-09-29

## 2024-09-27 RX ADMIN — APIXABAN 5 MG: 5 TABLET, FILM COATED ORAL at 07:55

## 2024-09-27 RX ADMIN — SODIUM CHLORIDE, PRESERVATIVE FREE 10 ML: 5 INJECTION INTRAVENOUS at 07:55

## 2024-09-27 RX ADMIN — WATER 40 MG: 1 INJECTION INTRAMUSCULAR; INTRAVENOUS; SUBCUTANEOUS at 06:19

## 2024-09-27 RX ADMIN — IPRATROPIUM BROMIDE AND ALBUTEROL SULFATE 1 DOSE: 2.5; .5 SOLUTION RESPIRATORY (INHALATION) at 07:07

## 2024-09-27 RX ADMIN — DIAZEPAM 5 MG: 5 TABLET ORAL at 19:53

## 2024-09-27 RX ADMIN — ATORVASTATIN CALCIUM 40 MG: 40 TABLET, FILM COATED ORAL at 19:53

## 2024-09-27 RX ADMIN — Medication 3 MG: at 19:54

## 2024-09-27 RX ADMIN — SODIUM CHLORIDE 250 MG: 9 INJECTION, SOLUTION INTRAVENOUS at 09:59

## 2024-09-27 RX ADMIN — FUROSEMIDE 40 MG: 40 TABLET ORAL at 07:54

## 2024-09-27 RX ADMIN — SODIUM CHLORIDE, PRESERVATIVE FREE 10 ML: 5 INJECTION INTRAVENOUS at 19:56

## 2024-09-27 RX ADMIN — IPRATROPIUM BROMIDE AND ALBUTEROL SULFATE 1 DOSE: 2.5; .5 SOLUTION RESPIRATORY (INHALATION) at 18:59

## 2024-09-27 RX ADMIN — IPRATROPIUM BROMIDE AND ALBUTEROL SULFATE 1 DOSE: 2.5; .5 SOLUTION RESPIRATORY (INHALATION) at 10:55

## 2024-09-27 RX ADMIN — APIXABAN 5 MG: 5 TABLET, FILM COATED ORAL at 19:53

## 2024-09-27 RX ADMIN — PANTOPRAZOLE SODIUM 40 MG: 40 TABLET, DELAYED RELEASE ORAL at 06:18

## 2024-09-27 RX ADMIN — CARVEDILOL 6.25 MG: 6.25 TABLET, FILM COATED ORAL at 19:53

## 2024-09-27 RX ADMIN — CARVEDILOL 6.25 MG: 6.25 TABLET, FILM COATED ORAL at 07:55

## 2024-09-27 ASSESSMENT — PAIN SCALES - GENERAL
PAINLEVEL_OUTOF10: 0

## 2024-09-27 NOTE — PROGRESS NOTES
ambulate ' with device as needed and SBA  Short Term Goal 4: pt to tolerate 8-10 minutes of standing/walking activity while maintaining SpO2 >90% to improve functional endurance    Education  Patient Education  Education Given To: Patient  Education Provided: Role of Therapy;Plan of Care;Fall Prevention Strategies;Transfer Training;Energy Conservation  Education Method: Demonstration;Verbal  Barriers to Learning: Cognition;Lack of Family Support;Hearing  Education Outcome: Verbalized understanding;Continued education needed    AM-PAC - Mobility    AM-PAC Basic Mobility - Inpatient   How much help is needed turning from your back to your side while in a flat bed without using bedrails?: A Little  How much help is needed moving from lying on your back to sitting on the side of a flat bed without using bedrails?: A Little  How much help is needed moving to and from a bed to a chair?: A Little  How much help is needed standing up from a chair using your arms?: A Little  How much help is needed walking in hospital room?: A Little  How much help is needed climbing 3-5 steps with a railing?: A Little  AM-PAC Inpatient Mobility Raw Score : 18  AM-PAC Inpatient T-Scale Score : 43.63  Mobility Inpatient CMS 0-100% Score: 46.58  Mobility Inpatient CMS G-Code Modifier : CK         Therapy Time   Individual Concurrent Group Co-treatment   Time In 1103         Time Out 1138         Minutes 35                 Jannie Coyle PTA

## 2024-09-27 NOTE — PLAN OF CARE
Problem: Safety - Adult  Goal: Free from fall injury  9/27/2024 1611 by Nazia Adame, RN  Outcome: Progressing     Problem: Skin/Tissue Integrity  Goal: Absence of new skin breakdown  Description: 1.  Monitor for areas of redness and/or skin breakdown  2.  Assess vascular access sites hourly  3.  Every 4-6 hours minimum:  Change oxygen saturation probe site  4.  Every 4-6 hours:  If on nasal continuous positive airway pressure, respiratory therapy assess nares and determine need for appliance change or resting period.  Problem: ABCDS Injury Assessment  Goal: Absence of physical injury  9/27/2024 1611 by Nazia Adame, RN  Outcome: Progressing     9/27/2024 1610 by Nazia Adame, RN  Outcome: Progressing

## 2024-09-27 NOTE — PROGRESS NOTES
tenderness.   Musculoskeletal: Normal range of motion.  Generalized weakness  Neurological:patient is alert and oriented to person, place, and time.   Skin: Skin is warm and dry.   Extremities: No edema or discoloration  Infusions:      sodium chloride       Meds:     Current Facility-Administered Medications:     [START ON 9/28/2024] furosemide (LASIX) tablet 20 mg, 20 mg, Oral, Arian MCCOLLUM Vaishali, MD    ferric gluconate (FERRLECIT) 250 mg in sodium chloride 0.9 % 100 mL IVPB, 250 mg, IntraVENous, Q24H, Johanne Don MD, Stopped at 09/27/24 1059    diazePAM (VALIUM) tablet 5 mg, 5 mg, Oral, BID PRN, Deysi Lopez MD, 5 mg at 09/26/24 1945    ipratropium 0.5 mg-albuterol 2.5 mg (DUONEB) nebulizer solution 1 Dose, 1 Dose, Inhalation, Q20 MIN PRN, Bruno Ferreira MD, 1 Dose at 09/24/24 1256    ipratropium 0.5 mg-albuterol 2.5 mg (DUONEB) nebulizer solution 1 Dose, 1 Dose, Inhalation, Q4H WA RT, Nabeel Mcguire MD, 1 Dose at 09/27/24 1055    methylPREDNISolone sodium succ (SOLU-MEDROL) 40 mg in sterile water 1 mL injection, 40 mg, IntraVENous, Q8H, Nabeel Mcguire MD, 40 mg at 09/27/24 0619    apixaban (ELIQUIS) tablet 5 mg, 5 mg, Oral, BID, Franklin Herzog MD, 5 mg at 09/27/24 0755    atorvastatin (LIPITOR) tablet 40 mg, 40 mg, Oral, QPM, Franklin Herzog MD, 40 mg at 09/26/24 1941    carvedilol (COREG) tablet 6.25 mg, 6.25 mg, Oral, BID, Franklin Herzog MD, 6.25 mg at 09/27/24 0755    sodium chloride flush 0.9 % injection 5-40 mL, 5-40 mL, IntraVENous, 2 times per day, Franklin Herzog MD, 10 mL at 09/27/24 0755    sodium chloride flush 0.9 % injection 5-40 mL, 5-40 mL, IntraVENous, PRN, Franklin Herzog MD    0.9 % sodium chloride infusion, , IntraVENous, PRN, Franklin Herzog MD    potassium chloride (KLOR-CON M) extended release tablet 40 mEq, 40 mEq, Oral, PRN **OR** potassium bicarb-citric acid (EFFER-K) effervescent tablet 40 mEq, 40 mEq, Oral, PRN **OR** potassium chloride 10 mEq/100 mL IVPB (Peripheral Line), 10 mEq,  swab but no signs of active infection  DNR CC arrest/DO NOT INTUBATE  PLAN:   Decrease Solu-Medrol  Continue bronchodilator therapy  Continue noninvasive ventilation at night and as needed during the day  Needs aggressive PT/OT  Would recommend noninvasive ventilatory support for chronic hypercapnic respite failure at facility  Needs follow-up CT scan chest for  Would benefit from outpatient PFT and pulmonary follow      Electronically signed by EDEL Johnson - CNP on 09/27/24     This progress note was completed using a voice transcription system. Every effort was made to ensure accuracy. However, inadvertent computerized transcription errors may be present.    ANTONELLA HOLLINGSWORTH AGACNP-BC, NP-C  [facility] Pulmonary, Critical Care & Sleep

## 2024-09-27 NOTE — CARE COORDINATION
DISCHARGE PLANNING NOTE:    LSW following for potential SNF placement. Patient was previously at [facility]. Son does not want patient to return. Son reports ultimately goal is for patient to return home with him, however is open to another facility if skilled care is recommended. Requests referrals be sent to [facility], [facility], [facility].

## 2024-09-27 NOTE — PROGRESS NOTES
IN-PATIENT SERVICE   [facility] Internal Medicine  [facility] Internal Medicine  Dayo Mcadams MD; Zay Ramires MD; Jam Matthew MD; MD Johanne Sanders MD; Min Giordano MD; Ambreen Avina MD        Progress Note    9/27/2024    8:37 AM    Name:   Timothy Kern  MRN:     [id number]     Acct:      [id number]   Room:   2006/2006-01  IP Day:  3  Admit Date:  9/24/2024 12:33 PM    PCP:   No primary care provider on file.  Code Status:  DNR-CCA    Subjective:     C/C:   Chief Complaint   Patient presents with    Shortness of Breath         Interval History Status: Improving    HPI:     74-year-old male history of COPD on 3 L nasal cannula oxygen at home, A-fib on Eliquis, CHF transferred from SNF due to shortness of breath for 2 weeks improved with CPAP, ABG showed elevated CO2 with normal pH indicative of chronic CO2 retention.  Chest x-ray concerning for right-sided pneumonia, patient also complained of right upper quadrant pain    Review of Systems:     Positive for shortness of breath  Denies chest pain or palpitations  Denies abdominal pain, diarrhea vomiting  Denies any new numbness tremors or weakness.      Medications:     Allergies:    Allergies   Allergen Reactions    Codeine Rash       Current Meds:   Scheduled Meds:    ipratropium 0.5 mg-albuterol 2.5 mg  1 Dose Inhalation Q4H WA RT    methylPREDNISolone  40 mg IntraVENous Q8H    apixaban  5 mg Oral BID    atorvastatin  40 mg Oral QPM    carvedilol  6.25 mg Oral BID    furosemide  40 mg Oral QAM    sodium chloride flush  5-40 mL IntraVENous 2 times per day    pantoprazole  40 mg Oral QAM AC    melatonin  3 mg Oral Nightly     Continuous Infusions:    sodium chloride       PRN Meds: diazePAM, ipratropium 0.5 mg-albuterol 2.5 mg, sodium chloride flush, sodium chloride, potassium chloride **OR** potassium alternative oral replacement **OR** potassium chloride, magnesium sulfate, ondansetron **OR** ondansetron,  polyethylene glycol, acetaminophen **OR** acetaminophen    Data:     Past Medical History:   has a past medical history of COPD (chronic obstructive pulmonary disease) (HCC), HTN (hypertension), Hypoxia, On home O2, and Pneumonia.    Social History:   reports that he has never smoked. He has never used smokeless tobacco.     Family History: No family history on file.    Vitals:  /76   Pulse 66   Temp 97.6 °F (36.4 °C) (Axillary)   Resp 26   Ht 1.753 m (5' 9\")   Wt 72.4 kg (159 lb 9.8 oz)   SpO2 92%   BMI 23.57 kg/m²   Temp (24hrs), Av.7 °F (36.5 °C), Min:96.8 °F (36 °C), Max:98.4 °F (36.9 °C)    No results for input(s): \"POCGLU\" in the last 72 hours.    I/O (24Hr):    Intake/Output Summary (Last 24 hours) at 2024 0837  Last data filed at 2024 0755  Gross per 24 hour   Intake 10 ml   Output 500 ml   Net -490 ml       Labs:    Lab Results   Component Value Date    WBC 12.4 (H) 2024    HGB 8.3 (L) 2024    HCT 25.2 (L) 2024    MCV 90.8 2024     2024     Lab Results   Component Value Date/Time     2024 03:52 AM    K 4.9 2024 03:52 AM    CL 91 2024 03:52 AM    CO2 45 2024 03:52 AM    BUN 35 2024 03:52 AM    CREATININE 0.8 2024 03:52 AM    GLUCOSE 175 2024 03:52 AM    CALCIUM 9.0 2024 03:52 AM          No results found for: \"SPECIAL\"  No results found for: \"CULTURE\"      Radiology:    Recent data reviewed    Physical Examination:        General appearance:  alert, cooperative and no distress  Eyes: Anicteric sclera. Pupils are equally round and reactive to light.  Extraocular movements are intact.  Lungs: Bilateral significantly reduced air entry, increased effort of breathing  Heart:  regular rate and rhythm, no murmur  Abdomen:  soft, appears distended, some right upper quadrant stiffness present.  Normal bowel sounds, no masses, hepatomegaly, splenomegaly  Extremities:  no edema, redness, tenderness in

## 2024-09-27 NOTE — PLAN OF CARE
Problem: Discharge Planning  Goal: Discharge to home or other facility with appropriate resources  9/27/2024 0039 by Stephany Jerome RN  Outcome: Progressing  Flowsheets (Taken 9/27/2024 0039)  Discharge to home or other facility with appropriate resources: Identify barriers to discharge with patient and caregiver     Problem: Pain  Goal: Verbalizes/displays adequate comfort level or baseline comfort level  9/27/2024 0039 by Stephany Jerome RN  Outcome: Progressing  Flowsheets  Taken 9/27/2024 0039 by Stephany Jerome RN  Verbalizes/displays adequate comfort level or baseline comfort level:   Encourage patient to monitor pain and request assistance   Assess pain using appropriate pain scale  Taken 9/26/2024 2000 by Petar Angeles RN  Verbalizes/displays adequate comfort level or baseline comfort level:   Encourage patient to monitor pain and request assistance   Assess pain using appropriate pain scale   Administer analgesics based on type and severity of pain and evaluate response   Implement non-pharmacological measures as appropriate and evaluate response     Problem: Safety - Adult  Goal: Free from fall injury  9/27/2024 0039 by Stephany Jerome RN  Outcome: Progressing  Flowsheets (Taken 9/27/2024 0039)  Free From Fall Injury:   Instruct family/caregiver on patient safety   Based on caregiver fall risk screen, instruct family/caregiver to ask for assistance with transferring infant if caregiver noted to have fall risk factors  Note: The patient remained free from falls this shift, call light within reach, bed in locked and lowest position.  Side rails up x2.     Problem: Skin/Tissue Integrity  Goal: Absence of new skin breakdown  Description: 1.  Monitor for areas of redness and/or skin breakdown  2.  Assess vascular access sites hourly  3.  Every 4-6 hours minimum:  Change oxygen saturation probe site  4.  Every 4-6 hours:  If on nasal continuous positive airway pressure, respiratory therapy assess nares

## 2024-09-28 LAB
ABSOLUTE BANDS: 0.15 K/UL (ref 0–1)
ANION GAP SERPL CALCULATED.3IONS-SCNC: ABNORMAL MMOL/L (ref 9–17)
BANDS: 1 % (ref 0–10)
BASOPHILS # BLD: 0 K/UL (ref 0–0.2)
BASOPHILS NFR BLD: 0 % (ref 0–2)
BUN SERPL-MCNC: 34 MG/DL (ref 8–23)
CALCIUM SERPL-MCNC: 9.7 MG/DL (ref 8.6–10.4)
CHLORIDE SERPL-SCNC: 91 MMOL/L (ref 98–107)
CO2 SERPL-SCNC: >45 MMOL/L (ref 20–31)
CREAT SERPL-MCNC: 0.8 MG/DL (ref 0.7–1.2)
EOSINOPHIL # BLD: 0 K/UL (ref 0–0.4)
EOSINOPHILS RELATIVE PERCENT: 0 % (ref 0–4)
ERYTHROCYTE [DISTWIDTH] IN BLOOD BY AUTOMATED COUNT: 16.3 % (ref 11.5–14.9)
GFR, ESTIMATED: >90 ML/MIN/1.73M2
GLUCOSE SERPL-MCNC: 163 MG/DL (ref 70–99)
HCT VFR BLD AUTO: 29.4 % (ref 41–53)
HGB BLD-MCNC: 9.3 G/DL (ref 13.5–17.5)
LYMPHOCYTES NFR BLD: 0.44 K/UL (ref 1–4.8)
LYMPHOCYTES RELATIVE PERCENT: 3 % (ref 24–44)
MCH RBC QN AUTO: 29.3 PG (ref 26–34)
MCHC RBC AUTO-ENTMCNC: 31.5 G/DL (ref 31–37)
MCV RBC AUTO: 93 FL (ref 80–100)
MONOCYTES NFR BLD: 0.58 K/UL (ref 0.1–1.3)
MONOCYTES NFR BLD: 4 % (ref 1–7)
MORPHOLOGY: ABNORMAL
NEUTROPHILS NFR BLD: 92 % (ref 36–66)
NEUTS SEG NFR BLD: 13.33 K/UL (ref 1.3–9.1)
PLATELET # BLD AUTO: 361 K/UL (ref 150–450)
PMV BLD AUTO: 8.5 FL (ref 6–12)
POTASSIUM SERPL-SCNC: 5.3 MMOL/L (ref 3.7–5.3)
RBC # BLD AUTO: 3.16 M/UL (ref 4.5–5.9)
SODIUM SERPL-SCNC: 142 MMOL/L (ref 135–144)
WBC OTHER # BLD: 14.5 K/UL (ref 3.5–11)

## 2024-09-28 PROCEDURE — 94660 CPAP INITIATION&MGMT: CPT

## 2024-09-28 PROCEDURE — 6360000002 HC RX W HCPCS: Performed by: NURSE PRACTITIONER

## 2024-09-28 PROCEDURE — 94761 N-INVAS EAR/PLS OXIMETRY MLT: CPT

## 2024-09-28 PROCEDURE — 80048 BASIC METABOLIC PNL TOTAL CA: CPT

## 2024-09-28 PROCEDURE — 2580000003 HC RX 258: Performed by: NURSE PRACTITIONER

## 2024-09-28 PROCEDURE — 36415 COLL VENOUS BLD VENIPUNCTURE: CPT

## 2024-09-28 PROCEDURE — 6360000002 HC RX W HCPCS: Performed by: INTERNAL MEDICINE

## 2024-09-28 PROCEDURE — 2700000000 HC OXYGEN THERAPY PER DAY

## 2024-09-28 PROCEDURE — 2580000003 HC RX 258

## 2024-09-28 PROCEDURE — 94640 AIRWAY INHALATION TREATMENT: CPT

## 2024-09-28 PROCEDURE — 85025 COMPLETE CBC W/AUTO DIFF WBC: CPT

## 2024-09-28 PROCEDURE — 6370000000 HC RX 637 (ALT 250 FOR IP): Performed by: INTERNAL MEDICINE

## 2024-09-28 PROCEDURE — 2060000000 HC ICU INTERMEDIATE R&B

## 2024-09-28 PROCEDURE — 2580000003 HC RX 258: Performed by: INTERNAL MEDICINE

## 2024-09-28 PROCEDURE — 99232 SBSQ HOSP IP/OBS MODERATE 35: CPT | Performed by: INTERNAL MEDICINE

## 2024-09-28 PROCEDURE — 6370000000 HC RX 637 (ALT 250 FOR IP)

## 2024-09-28 RX ADMIN — IPRATROPIUM BROMIDE AND ALBUTEROL SULFATE 1 DOSE: 2.5; .5 SOLUTION RESPIRATORY (INHALATION) at 06:41

## 2024-09-28 RX ADMIN — ATORVASTATIN CALCIUM 40 MG: 40 TABLET, FILM COATED ORAL at 17:06

## 2024-09-28 RX ADMIN — SODIUM CHLORIDE, PRESERVATIVE FREE 10 ML: 5 INJECTION INTRAVENOUS at 09:24

## 2024-09-28 RX ADMIN — APIXABAN 5 MG: 5 TABLET, FILM COATED ORAL at 09:23

## 2024-09-28 RX ADMIN — WATER 40 MG: 1 INJECTION INTRAMUSCULAR; INTRAVENOUS; SUBCUTANEOUS at 02:36

## 2024-09-28 RX ADMIN — WATER 40 MG: 1 INJECTION INTRAMUSCULAR; INTRAVENOUS; SUBCUTANEOUS at 13:31

## 2024-09-28 RX ADMIN — Medication 3 MG: at 20:24

## 2024-09-28 RX ADMIN — SODIUM CHLORIDE, PRESERVATIVE FREE 10 ML: 5 INJECTION INTRAVENOUS at 20:24

## 2024-09-28 RX ADMIN — CARVEDILOL 6.25 MG: 6.25 TABLET, FILM COATED ORAL at 20:24

## 2024-09-28 RX ADMIN — SODIUM CHLORIDE 250 MG: 9 INJECTION, SOLUTION INTRAVENOUS at 09:28

## 2024-09-28 RX ADMIN — IPRATROPIUM BROMIDE AND ALBUTEROL SULFATE 1 DOSE: 2.5; .5 SOLUTION RESPIRATORY (INHALATION) at 19:04

## 2024-09-28 RX ADMIN — PANTOPRAZOLE SODIUM 40 MG: 40 TABLET, DELAYED RELEASE ORAL at 05:31

## 2024-09-28 RX ADMIN — IPRATROPIUM BROMIDE AND ALBUTEROL SULFATE 1 DOSE: 2.5; .5 SOLUTION RESPIRATORY (INHALATION) at 10:31

## 2024-09-28 RX ADMIN — IPRATROPIUM BROMIDE AND ALBUTEROL SULFATE 1 DOSE: 2.5; .5 SOLUTION RESPIRATORY (INHALATION) at 14:40

## 2024-09-28 RX ADMIN — APIXABAN 5 MG: 5 TABLET, FILM COATED ORAL at 20:24

## 2024-09-28 RX ADMIN — DIAZEPAM 5 MG: 5 TABLET ORAL at 09:27

## 2024-09-28 RX ADMIN — DIAZEPAM 5 MG: 5 TABLET ORAL at 20:31

## 2024-09-28 RX ADMIN — CARVEDILOL 6.25 MG: 6.25 TABLET, FILM COATED ORAL at 09:23

## 2024-09-28 RX ADMIN — FUROSEMIDE 20 MG: 20 TABLET ORAL at 09:23

## 2024-09-28 ASSESSMENT — PAIN SCALES - GENERAL
PAINLEVEL_OUTOF10: 0
PAINLEVEL_OUTOF10: 0

## 2024-09-28 NOTE — PROGRESS NOTES
[facility] PULMONARY,CRITICAL CARE & SLEEP   Benjie Pardo MD/Giorgio Mcguire MD/Zev Hernandez APRN AGACNP-BC, NP-C      Abby HOLLINGSWORTH NP-C    Félix HOLLINGSWORTH NP-C                                         Pulmonary Progress Note    Patient - Timothy Kern   Age - 74 y.o.   - 1950  MRN - [id number]  [id number]  Date of Admission - 2024 12:33 PM    Consulting Service/Physician:       Primary Care Physician: No primary care provider on file.    SUBJECTIVE:     Chief Complaint:   Chief Complaint   Patient presents with    Shortness of Breath     Subjective:    Mr. Guerrero is resting in bed  He states he used an NIV for most the night  He does seem alert and oriented to person place and time  His CO2 on BMP this morning was greater than 45, He was receiving Lasix 40 mg p.o. and this morning this switched to 20 mg p.o. by primary team,   Discussed necessity of using NIV when he sleeping and naps, this should be followed with him to his nursing facility, he was from [facility] and it sounds like this may be changed for discharge    VITALS  BP (!) 147/81   Pulse (!) 108   Temp 98 °F (36.7 °C) (Oral)   Resp 16   Ht 1.753 m (5' 9\")   Wt 72.4 kg (159 lb 9.8 oz)   SpO2 99%   BMI 23.57 kg/m²   Wt Readings from Last 3 Encounters:   24 72.4 kg (159 lb 9.8 oz)     I/O (24 Hours)    Intake/Output Summary (Last 24 hours) at 2024 0841  Last data filed at 2024 0613  Gross per 24 hour   Intake 360 ml   Output 475 ml   Net -115 ml     Ventilator:   Settings  FiO2 : 30 %  Insp Rise Time (%): 1 %  Exam:   Physical Exam   Constitutional: In no acute distress, alert to self place and time  HENT: Unremarkable  Head: Normocephalic and atraumatic.   Eyes: EOM are normal. Pupils are equal, round, and reactive to light.   Neck: Neck supple.   Cardiovascular: Regular rate, rhythm, no JVD no  Pulmonary/Chest: Unlabored, shallow breaths, 3 L nasal  forced vital capacity 39%, FEV1 22% / 0.69 L, residual volume 224%  Tobacco dependence in remission quit smoking January 2024  Pulmonary cachexia  Multiple bilateral lung scars and nodular opacities, malignancy felt to be less likely but not totally excluded  Subcarinal mediastinal adenopathy  Ascending aortic aneurysm 4.4 cm stable since last year  Chronic debility  History of atrial fibrillation per medical record, on Eliquis  MRSA colonizer positive nasal swab but no signs of active infection  DNR CC arrest/DO NOT INTUBATE  PLAN:   Continue bronchodilator therapy with nebulizers every 4 hours  Monitor SpO2 with goal sats greater than 88%, on 3 L nasal cannula at his baseline  Continue NIV at bedtime, with naps, and as needed; monitor for mentation change  CO2 greater than 45 on BMP today, Lasix dosed reduced to 20 mg per primary team  Ambulate as tolerated, needs PT OT  He will need follow-up CT chest without contrast in 6 to 8 weeks for 2.2 cm left medial basilar nodule opacity and resolving pulm andreea infiltrate/infection versus underlying lesion  He would benefit from outpatient PFT and pulmonary follow-up; whenever nursing facility goes to need to be able to accommodate an NIV      Electronically signed by EDEL BULLOCK CNP on 09/28/24   [facility] Pulmonary, Critical Care & Sleep    Available via [employer]    [place] Office:  [address] , [place] [address] [phone]     [place] Office:  [address], [place] [address] [phone]     This progress note was completed using a voice transcription system. Every effort was made to ensure accuracy. However, inadvertent computerized transcription errors may be present.

## 2024-09-28 NOTE — PROGRESS NOTES
[facility]  IN-PATIENT SERVICE  [facility] [place] [facility]    PROGRESS NOTE             9/28/2024    9:07 AM    Name:   Timothy Kern  MRN:     [id number]     Acct:      [id number]   Room:   2089/2089-01   Day:  4  Admit Date:  9/24/2024 12:33 PM    PCP:  No primary care provider on file.  Code Status:  DNR-CCA    Subjective:     C/C:   Chief Complaint   Patient presents with    Shortness of Breath     Interval History Status: improved.    The patient was seen and examination.  His heart rate was 108 this morning, blood pressure 147/81.  He he was sitting in the bedside, feeling much better today.  He is on 3 L of oxygen through nasal cannula, this is his baseline.  He did use BiPAP last night.    His abdominal pain has improved.  He has a history of cholecystectomy, surgical clips on abdominal x-ray.  Liver function tests are normal.  Ultrasound liver showed no acute abnormality, 6.5 mm cyst in the region of the pancreatic head.    On chest examination, sounds are decreased bilaterally, no wheezing or rhonchi.    On labs, his hemoglobin improved to 9.3 from 8.3 today.  WBC 14.5, most probably due to steroids.    Brief History:     The patient is a 74 y.o.  Unavailable / unknown male who presents withShortness of Breath   and he is admitted to the hospital for the management of COPD exacerbation.     He has a past medical history of COPD (on 3 L of oxygen at home), atrial fibrillation (on Eliquis), and congestive heart failure.  He was living in [place] and then moved to [place].  We do not have any medical records for him.  Today the patient was transferred from [facility] for shortness of breath.  Since the last 2 weeks, his breathing got worse at nursing facility.  There is no history of fever, chills, chest pain.  On examination, sounds were absent on the right side, diffuse wheezing on the left side.     On presentation, he was severely tachypneic (respiratory rate 34)  needed  Pulmonology following     Right upper quadrant abdominal pain  Right upper quadrant pain on palpation, some guarding present  History of cholecystectomy  X-ray KUB showed surgical clips in the right upper quadrant  Lipase and LFTs are normal  Ultrasound showed no acute abnormality,6.5 mm cyst in the region of the pancreatic head.    Anemia  Hemoglobin on presentation was 8.3  Iron studies are consistent with iron deficiency anemia  Started the patient on Ferrlecit (first dose 9/27)  Hemoglobin improved to 9.3 on 9/28  Stool occult blood pending     Congestive heart failure  No previous echo  On Coreg, Lasix and Lipitor     Atrial fibrillation  On Eliquis     DVT prophylaxis: On Eliquis  GI prophylaxis: Protonix      Plan will be discussed with the attending    Franklin Herzog MD  PGY I Transitional Year Resident  9/28/2024 9:07 AM     Attending Physician Statement  I have discussed the care of Timothy Kern with the resident team. I have examined the patient myself and taken ros and hpi , including pertinent history and exam findings,  with the resident. I have reviewed the key elements of all parts of the encounter with the resident.  I agree with the assessment, plan and orders as documented by the resident.     Principal Problem:    CAP (community acquired pneumonia) due to Chlamydia species  Resolved Problems:    * No resolved hospital problems. *     74-year-old male history of COPD on 3 L nasal cannula oxygen at home, A-fib on Eliquis, CHF transferred from SNF due to shortness of breath for 2 weeks improved with CPAP, ABG showed elevated CO2 with normal pH indicative of chronic CO2 retention.  Chest x-ray concerning for right-sided pneumonia, patient also complained of right upper quadrant pain  Acute on chronic hypoxic hypercapnic respiratory failure secondary to COPD exacerbation and  right-sided infiltrate possible healthcare associated pneumonia treated empirically no growth on cultures.  Completed

## 2024-09-28 NOTE — PLAN OF CARE
by Roselia Pal, RN  Outcome: Progressing  Flowsheets (Taken 9/28/2024 0845)  Free From Fall Injury:   Instruct family/caregiver on patient safety   Based on caregiver fall risk screen, instruct family/caregiver to ask for assistance with transferring infant if caregiver noted to have fall risk factors  9/28/2024 0322 by Stephany Jerome, RN  Outcome: Progressing  Flowsheets (Taken 9/28/2024 0322)  Free From Fall Injury:   Instruct family/caregiver on patient safety   Based on caregiver fall risk screen, instruct family/caregiver to ask for assistance with transferring infant if caregiver noted to have fall risk factors  Note: The patient remained free from falls this shift, call light within reach, bed in locked and lowest position.  Side rails up x2.      Problem: Skin/Tissue Integrity  Goal: Absence of new skin breakdown  Description: 1.  Monitor for areas of redness and/or skin breakdown  2.  Assess vascular access sites hourly  3.  Every 4-6 hours minimum:  Change oxygen saturation probe site  4.  Every 4-6 hours:  If on nasal continuous positive airway pressure, respiratory therapy assess nares and determine need for appliance change or resting period.  Outcome: Progressing     Problem: Respiratory - Adult  Goal: Achieves optimal ventilation and oxygenation  9/28/2024 1540 by Roselia Pal, RN  Outcome: Progressing  Flowsheets (Taken 9/28/2024 0845)  Achieves optimal ventilation and oxygenation:   Assess for changes in respiratory status   Assess for changes in mentation and behavior   Initiate smoking cessation protocol as indicated   Encourage broncho-pulmonary hygiene including cough, deep breathe, incentive spirometry   Position to facilitate oxygenation and minimize respiratory effort   Oxygen supplementation based on oxygen saturation or arterial blood gases   Assess the need for suctioning and aspirate as needed   Assess and instruct to report shortness of breath or any respiratory difficulty    Gastrointestinal - Adult  Goal: Minimal or absence of nausea and vomiting  Outcome: Progressing  Flowsheets (Taken 9/28/2024 0845)  Minimal or absence of nausea and vomiting:   Administer IV fluids as ordered to ensure adequate hydration   Maintain NPO status until nausea and vomiting are resolved   Nasogastric tube to low intermittent suction as ordered   Administer ordered antiemetic medications as needed   Advance diet as tolerated, if ordered   Nutrition consult to assist patient with adequate nutrition and appropriate food choices   Provide nonpharmacologic comfort measures as appropriate  Goal: Maintains or returns to baseline bowel function  Outcome: Progressing  Flowsheets (Taken 9/28/2024 0845)  Maintains or returns to baseline bowel function:   Assess bowel function   Encourage oral fluids to ensure adequate hydration   Administer IV fluids as ordered to ensure adequate hydration   Administer ordered medications as needed   Encourage mobilization and activity   Nutrition consult to assist patient with appropriate food choices     Problem: ABCDS Injury Assessment  Goal: Absence of physical injury  9/28/2024 1540 by Roselia Pal, RN  Outcome: Progressing  Flowsheets (Taken 9/28/2024 0845)  Absence of Physical Injury: Implement safety measures based on patient assessment  9/28/2024 0322 by Stephany Jerome, RN  Outcome: Progressing  Flowsheets (Taken 9/28/2024 0322)  Absence of Physical Injury: Implement safety measures based on patient assessment  Note: The patient remained free from injury this shift, call light within reach, bed in locked and lowest position.  Side rails up x2.

## 2024-09-29 LAB
ANION GAP SERPL CALCULATED.3IONS-SCNC: ABNORMAL MMOL/L (ref 9–17)
ARTERIAL PATENCY WRIST A: ABNORMAL
ARTERIAL PATENCY WRIST A: ABNORMAL
BASOPHILS # BLD: 0 K/UL (ref 0–0.2)
BASOPHILS NFR BLD: 0 % (ref 0–2)
BDY SITE: ABNORMAL
BDY SITE: ABNORMAL
BODY TEMPERATURE: 37
BODY TEMPERATURE: 37
BUN SERPL-MCNC: 27 MG/DL (ref 8–23)
CALCIUM SERPL-MCNC: 9.7 MG/DL (ref 8.6–10.4)
CHLORIDE SERPL-SCNC: 91 MMOL/L (ref 98–107)
CO2 SERPL-SCNC: >45 MMOL/L (ref 20–31)
COHGB MFR BLD: 1.2 % (ref 0–5)
COHGB MFR BLD: 1.3 % (ref 0–5)
CREAT SERPL-MCNC: 0.7 MG/DL (ref 0.7–1.2)
EOSINOPHIL # BLD: 0.16 K/UL (ref 0–0.4)
EOSINOPHILS RELATIVE PERCENT: 1 % (ref 0–4)
ERYTHROCYTE [DISTWIDTH] IN BLOOD BY AUTOMATED COUNT: 16.5 % (ref 11.5–14.9)
FIO2 ON VENT: 30 %
FIO2 ON VENT: ABNORMAL %
GAS FLOW.O2 O2 DELIVERY SYS: ABNORMAL L/MIN
GAS FLOW.O2 O2 DELIVERY SYS: ABNORMAL L/MIN
GFR, ESTIMATED: >90 ML/MIN/1.73M2
GLUCOSE SERPL-MCNC: 155 MG/DL (ref 70–99)
HCO3 ARTERIAL: 49.3 MMOL/L (ref 22–26)
HCO3 ARTERIAL: 49.9 MMOL/L (ref 22–26)
HCT VFR BLD AUTO: 28.1 % (ref 41–53)
HGB BLD-MCNC: 9.1 G/DL (ref 13.5–17.5)
LYMPHOCYTES NFR BLD: 0.48 K/UL (ref 1–4.8)
LYMPHOCYTES RELATIVE PERCENT: 3 % (ref 24–44)
MCH RBC QN AUTO: 30.1 PG (ref 26–34)
MCHC RBC AUTO-ENTMCNC: 32.5 G/DL (ref 31–37)
MCV RBC AUTO: 92.5 FL (ref 80–100)
METHEMOGLOBIN: 1.4 % (ref 0–1.9)
MONOCYTES NFR BLD: 1.11 K/UL (ref 0.1–1.3)
MONOCYTES NFR BLD: 7 % (ref 1–7)
MORPHOLOGY: ABNORMAL
NEUTROPHILS NFR BLD: 89 % (ref 36–66)
NEUTS SEG NFR BLD: 14.15 K/UL (ref 1.3–9.1)
O2 SAT, ARTERIAL: 93.3 % (ref 95–98)
O2 SAT, ARTERIAL: 96.2 % (ref 95–98)
PCO2 ARTERIAL: 71.6 MMHG (ref 35–45)
PCO2 ARTERIAL: 85 MMHG (ref 35–45)
PH ARTERIAL: 7.37 (ref 7.35–7.45)
PH ARTERIAL: 7.44 (ref 7.35–7.45)
PLATELET # BLD AUTO: 349 K/UL (ref 150–450)
PMV BLD AUTO: 8.5 FL (ref 6–12)
PO2 ARTERIAL: 78.1 MMHG (ref 80–100)
PO2 ARTERIAL: 81 MMHG (ref 80–100)
POSITIVE BASE EXCESS, ART: 24.1 MMOL/L (ref 0–2)
POSITIVE BASE EXCESS, ART: 25 MMOL/L (ref 0–2)
POTASSIUM SERPL-SCNC: 4.7 MMOL/L (ref 3.7–5.3)
PT. POSITION: ABNORMAL
PT. POSITION: ABNORMAL
RBC # BLD AUTO: 3.03 M/UL (ref 4.5–5.9)
RESPIRATORY RATE: 20
RESPIRATORY RATE: 22
SODIUM SERPL-SCNC: 142 MMOL/L (ref 135–144)
WBC OTHER # BLD: 15.9 K/UL (ref 3.5–11)

## 2024-09-29 PROCEDURE — 94761 N-INVAS EAR/PLS OXIMETRY MLT: CPT

## 2024-09-29 PROCEDURE — 85025 COMPLETE CBC W/AUTO DIFF WBC: CPT

## 2024-09-29 PROCEDURE — 6360000002 HC RX W HCPCS: Performed by: INTERNAL MEDICINE

## 2024-09-29 PROCEDURE — 97530 THERAPEUTIC ACTIVITIES: CPT

## 2024-09-29 PROCEDURE — 6370000000 HC RX 637 (ALT 250 FOR IP): Performed by: INTERNAL MEDICINE

## 2024-09-29 PROCEDURE — 2700000000 HC OXYGEN THERAPY PER DAY

## 2024-09-29 PROCEDURE — 82805 BLOOD GASES W/O2 SATURATION: CPT

## 2024-09-29 PROCEDURE — 2580000003 HC RX 258

## 2024-09-29 PROCEDURE — 99232 SBSQ HOSP IP/OBS MODERATE 35: CPT | Performed by: INTERNAL MEDICINE

## 2024-09-29 PROCEDURE — 97116 GAIT TRAINING THERAPY: CPT

## 2024-09-29 PROCEDURE — 1200000000 HC SEMI PRIVATE

## 2024-09-29 PROCEDURE — 6360000002 HC RX W HCPCS: Performed by: NURSE PRACTITIONER

## 2024-09-29 PROCEDURE — 6370000000 HC RX 637 (ALT 250 FOR IP)

## 2024-09-29 PROCEDURE — 80048 BASIC METABOLIC PNL TOTAL CA: CPT

## 2024-09-29 PROCEDURE — 6370000000 HC RX 637 (ALT 250 FOR IP): Performed by: NURSE PRACTITIONER

## 2024-09-29 PROCEDURE — 94640 AIRWAY INHALATION TREATMENT: CPT

## 2024-09-29 PROCEDURE — 97110 THERAPEUTIC EXERCISES: CPT

## 2024-09-29 PROCEDURE — 36415 COLL VENOUS BLD VENIPUNCTURE: CPT

## 2024-09-29 PROCEDURE — 2580000003 HC RX 258: Performed by: NURSE PRACTITIONER

## 2024-09-29 PROCEDURE — 36600 WITHDRAWAL OF ARTERIAL BLOOD: CPT

## 2024-09-29 PROCEDURE — 2580000003 HC RX 258: Performed by: INTERNAL MEDICINE

## 2024-09-29 RX ORDER — CALCIUM CARBONATE 500 MG/1
500 TABLET, CHEWABLE ORAL 3 TIMES DAILY PRN
Status: DISCONTINUED | OUTPATIENT
Start: 2024-09-29 | End: 2024-10-04 | Stop reason: HOSPADM

## 2024-09-29 RX ORDER — IPRATROPIUM BROMIDE AND ALBUTEROL SULFATE 2.5; .5 MG/3ML; MG/3ML
1 SOLUTION RESPIRATORY (INHALATION) EVERY 4 HOURS PRN
Status: DISCONTINUED | OUTPATIENT
Start: 2024-09-29 | End: 2024-10-04 | Stop reason: HOSPADM

## 2024-09-29 RX ADMIN — IPRATROPIUM BROMIDE AND ALBUTEROL SULFATE 1 DOSE: 2.5; .5 SOLUTION RESPIRATORY (INHALATION) at 15:17

## 2024-09-29 RX ADMIN — DIAZEPAM 5 MG: 5 TABLET ORAL at 11:32

## 2024-09-29 RX ADMIN — WATER 40 MG: 1 INJECTION INTRAMUSCULAR; INTRAVENOUS; SUBCUTANEOUS at 13:45

## 2024-09-29 RX ADMIN — Medication 3 MG: at 19:53

## 2024-09-29 RX ADMIN — IPRATROPIUM BROMIDE AND ALBUTEROL SULFATE 1 DOSE: 2.5; .5 SOLUTION RESPIRATORY (INHALATION) at 07:01

## 2024-09-29 RX ADMIN — ANTACID TABLETS 500 MG: 500 TABLET, CHEWABLE ORAL at 19:52

## 2024-09-29 RX ADMIN — FUROSEMIDE 20 MG: 20 TABLET ORAL at 09:59

## 2024-09-29 RX ADMIN — CARVEDILOL 6.25 MG: 6.25 TABLET, FILM COATED ORAL at 09:59

## 2024-09-29 RX ADMIN — ATORVASTATIN CALCIUM 40 MG: 40 TABLET, FILM COATED ORAL at 17:58

## 2024-09-29 RX ADMIN — IPRATROPIUM BROMIDE AND ALBUTEROL SULFATE 1 DOSE: 2.5; .5 SOLUTION RESPIRATORY (INHALATION) at 05:24

## 2024-09-29 RX ADMIN — APIXABAN 5 MG: 5 TABLET, FILM COATED ORAL at 19:53

## 2024-09-29 RX ADMIN — PANTOPRAZOLE SODIUM 40 MG: 40 TABLET, DELAYED RELEASE ORAL at 05:13

## 2024-09-29 RX ADMIN — CARVEDILOL 6.25 MG: 6.25 TABLET, FILM COATED ORAL at 19:53

## 2024-09-29 RX ADMIN — WATER 40 MG: 1 INJECTION INTRAMUSCULAR; INTRAVENOUS; SUBCUTANEOUS at 02:58

## 2024-09-29 RX ADMIN — SODIUM CHLORIDE 250 MG: 9 INJECTION, SOLUTION INTRAVENOUS at 10:55

## 2024-09-29 RX ADMIN — IPRATROPIUM BROMIDE AND ALBUTEROL SULFATE 1 DOSE: 2.5; .5 SOLUTION RESPIRATORY (INHALATION) at 19:10

## 2024-09-29 RX ADMIN — SODIUM CHLORIDE, PRESERVATIVE FREE 10 ML: 5 INJECTION INTRAVENOUS at 19:53

## 2024-09-29 RX ADMIN — SODIUM CHLORIDE, PRESERVATIVE FREE 10 ML: 5 INJECTION INTRAVENOUS at 09:59

## 2024-09-29 RX ADMIN — IPRATROPIUM BROMIDE AND ALBUTEROL SULFATE 1 DOSE: 2.5; .5 SOLUTION RESPIRATORY (INHALATION) at 10:48

## 2024-09-29 RX ADMIN — APIXABAN 5 MG: 5 TABLET, FILM COATED ORAL at 09:59

## 2024-09-29 ASSESSMENT — PAIN SCALES - GENERAL: PAINLEVEL_OUTOF10: 0

## 2024-09-29 NOTE — PROGRESS NOTES
[facility]  IN-PATIENT SERVICE  [facility]    PROGRESS NOTE             9/29/2024    9:04 AM    Name:   Timothy Kern  MRN:     [id number]     Acct:      [id number]   Room:   2089/2089-01   Day:  5  Admit Date:  9/24/2024 12:33 PM    PCP:  No primary care provider on file.  Code Status:  DNR-CCA    Subjective:     C/C:   Chief Complaint   Patient presents with    Shortness of Breath     Interval History Status: improved.    The patient was seen and examination.  Vitally stable.  He he was sitting at the bedside.  Overnight, he started having shortness of breath while being on BiPAP.  He did received respiratory treatment (DuoNeb) and felt better.    Pending placement.    Brief History:     The patient is a 74 y.o.  Unavailable / unknown male who presents withShortness of Breath   and he is admitted to the hospital for the management of COPD exacerbation.     He has a past medical history of COPD (on 3 L of oxygen at home), atrial fibrillation (on Eliquis), and congestive heart failure.  He was living in [place] and then moved to [place].  We do not have any medical records for him.  Today the patient was transferred from [facility] for shortness of breath.  Since the last 2 weeks, his breathing got worse at nursing facility.  There is no history of fever, chills, chest pain.  On examination, sounds were absent on the right side, diffuse wheezing on the left side.     On presentation, he was severely tachypneic (respiratory rate 34) and hypoxic.  EMS put him on CPAP, which helped him.  He does have a history of leg swelling, but there was no swelling on presentation.     ABG showed chronic CO2 retention.  ABGs showed chronic CO2 retention, pCO2 is 95, bicarb 52.5, almost normal pH.  Chest x-ray showed patchy infiltrates in the right mid and lower lung lobe with underlying discoid atelectasis.     He also complained of right upper quadrant pain, which was  There is diffuse mild bronchiectasis. Soft Tissues/Bones: No acute osseous abnormality.  Mild chronic wedge deformity of T12 vertebral body. Abdomen/Pelvis: Organs: Exam is limited by the absence of intravenous contrast.  No focal abnormality in the liver.  The gallbladder is surgically absent.  No biliary ductal dilatation.  The spleen is normal in size without focal lesion.  The pancreas and the adrenal glands are unremarkable.  No urinary tract calculus. No collecting system dilatation.  No concerning renal lesion. GI/Bowel: There is no bowel obstruction.  Mild colonic diverticulosis without evidence for diverticulitis.  The appendix is not definitely identified. However, there are no secondary findings to suggest the presence of acute appendicitis. Pelvis: Mild prostatomegaly.  The urinary bladder is distended, but otherwise unremarkable. Peritoneum/Retroperitoneum: Advanced aorta iliac atherosclerotic calcific disease which affect the visceral branch arteries.  No abdominal lymphadenopathy or ascites. 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  No acute osseous abnormality.     2.2 cm left medial basilar nodular opacity which abuts the pleura has somewhat rounded borders in the sagittal and coronal imaging.  Resolving pulmonic infiltrate/infection versus underlying lesion would be difficult to exclude.  CT chest in 6-8 weeks would be recommended to ensure stability/resolution. Emphysematous changes of the lungs, bronchiectasis with scattered bilateral pleuroparenchymal scarring. 4.2 cm ascending aortic aneurysm. Otherwise, no acute process in the chest, abdomen or pelvis.     XR CHEST PORTABLE    Result Date: 9/25/2024  EXAMINATION: ONE XRAY VIEW OF THE CHEST 9/25/2024 6:33 am COMPARISON: 09/24/2024 HISTORY: ORDERING SYSTEM PROVIDED HISTORY: sob, pneumonia? TECHNOLOGIST PROVIDED HISTORY: sob, pneumonia? Reason for Exam: dyspnea FINDINGS: Lines and

## 2024-09-29 NOTE — PROGRESS NOTES
Physical Therapy  Rehabilitation Physical Therapy    Date: 2024  Patient Name: Timothy Kern      Room:   MRN: [id number]    : 1950  (74 y.o.)  Gender: male     Referring Practitioner: Franklin Herzog MD  Diagnosis: CAP (community acquired pneumonia) due to Chlamydia species  Additional Pertinent Hx: The patient is a 74 y.o. male who presents withShortness of Breath  and he is admitted to the hospital for the management of COPD exacerbation. He has a past medical history of COPD (on 3 L of oxygen at home), atrial fibrillation (on Eliquis), and congestive heart failure.  He was living in [place] and then moved to [place].  We do not have any medical records for him.  Today the patient was transferred from [facility] for shortness of breath.  Since the last 2 weeks, his breathing got worse at nursing facility.  There is no history of fever, chills, chest pain.  On examination, sounds were absent on the right side, diffuse wheezing on the left side    Discharge Recommendations:  Patient would benefit from continued therapy after discharge  Equipment Needed:  (CTA)    Assessment  Assessment  Activity Tolerance: Patient limited by fatigue;Patient limited by endurance  Discharge Recommendations: Patient would benefit from continued therapy after discharge    Plan  Physical Therapy Plan  General Plan: 3-5 times per week  Current Treatment Recommendations: Balance training, Functional mobility training, Transfer training, Endurance training, Gait training, Strengthening, Safety education & training, Patient/Caregiver education & training, Therapeutic activities     Restrictions  Restrictions/Precautions: General Precautions, Contact Precautions, Fall Risk (MRSA)  Implants present? :  (patient denies)  Other position/activity restrictions: up with assist, impulsive, monitor SP02 PRN- on 3L    Subjective  Subjective  Subjective: Patient resting in bed upon approach, agreeable to PT intervention at  this time. WILLARD Mohan approved treatment.  Pain: Deneis pain.   Overall Cognitive Status: Exceptions  Arousal/Alertness: Appears intact  Following Commands: Follows one step commands with increased time, Follows one step commands with repetition  Attention Span: Attends with cues to redirect  Memory: Impaired  Safety Judgement: Decreased awareness of need for assistance, Decreased awareness of need for safety  Problem Solving: Decreased awareness of errors, Assistance required to correct errors made, Assistance required to identify errors made, Assistance required to implement solutions, Assistance required to generate solutions  Insights: Decreased awareness of deficits  Initiation: Requires cues for some  Sequencing: Requires cues for some  Cognition Comment: patient demonstrating with impulsivity during session with overall decreased safety awareness and increased time for processing  Overall Orientation Status: Within Functional Limits  Orientation Level: Oriented X4    Vitals  Temp: 98.5 °F (36.9 °C)  Pulse: 80  Heart Rate Source: Monitor  Respirations: 20  SpO2: 96 %  O2 Device: Nasal cannula  BP: (!) 148/86  MAP (Calculated): 107  BP Location: Left upper arm  BP Method: Automatic  Patient Position: Sitting  Comment: SpO2 monitored with activity & charted below.    Objective  Bed Mobility  Bed Mobility: Yes (HOB slightly elevated ~20º, entering/exiting left & right side of bed; SpO2 92% on 3 Lt)  Rolling: Modified independent (rolling left<>right)  Supine to Sit: Modified independent  Sit to Supine: Modified independent  Scooting: Modified independent    Transfer Training  Transfer Training: Yes  Overall Level of Assistance: Supervision  Interventions: Safety awareness training, Verbal cues (cueing for hand placement)  Sit to Stand: Supervision  Stand to Sit: Supervision  Stand Pivot Transfers: Supervision (with RW)  Toilet Transfer: Stand-by assistance    Gait Training  Gait Training: Yes  Left Side Weight

## 2024-09-29 NOTE — PROGRESS NOTES
[facility] PULMONARY,CRITICAL CARE & SLEEP   Benjiecindy Pardo MD/Giorgio Mcguire MD/Zev HOLLINGSWORTH AGACNP-BC, NP-C      Abby HOLLINGSWORTH NP-C    Félix HOLLINGSWORTH NP-C                                         Pulmonary Progress Note    Patient - Timothy Kern   Age - 74 y.o.   - 1950  MRN - [id number]  Acct # - [id number]  Date of Admission - 2024 12:33 PM    Consulting Service/Physician:       Primary Care Physician: No primary care provider on file.    SUBJECTIVE:     Chief Complaint:   Chief Complaint   Patient presents with    Shortness of Breath     Subjective:    Patient resting in bed  Tells me did not tolerate BiPAP overnight, settings were 16/6 backup rate 14, FiO2 30  He gets Valium every night every 12 hours  States he wakes up at 4 AM and feels like he cannot breathe  Tried to explained to him its likely related to oversedation and apnea in combination of not optimized BiPAP settings;   Denies that benzodiazepines will increase CO2 despite constant reinforcement    Discussed for now I will just adjust his BiPAP to 18/8 and see how he does, we need to target tidal volume if noticed low volume alarms; apparently this has been happening on multiple nights, I do not see anything documented and no staff is reach out to us to addressed this concern        VITALS  BP (!) 152/82   Pulse 84   Temp 98.6 °F (37 °C) (Oral)   Resp 20   Ht 1.753 m (5' 9\")   Wt 72.4 kg (159 lb 9.8 oz)   SpO2 95%   BMI 23.57 kg/m²   Wt Readings from Last 3 Encounters:   24 72.4 kg (159 lb 9.8 oz)     I/O (24 Hours)    Intake/Output Summary (Last 24 hours) at 2024 1330  Last data filed at 2024 1325  Gross per 24 hour   Intake --   Output 1075 ml   Net -1075 ml     Ventilator:   Settings  FiO2 : 30 %  Insp Rise Time (%): 1 %  Exam:   Physical Exam   Constitutional: In no acute distress, alert to self place and time  HENT: Unremarkable  Head: Normocephalic and  medications, discussed case with Dr. Brennan and we got an ABG pH is 7.372 pCO2 is 85 with a bicarb of 49, I believe his baseline is likely somewhere in the 60s to high 50s; per Dr. mcleod I am discontinuing benzo and repeating gas in 1 hour while patient is on v60       Electronically signed by EDEL BULLOCK CNP on 09/29/24   [facility] Pulmonary, Critical Care & Sleep    Available via [employer]    [place] Office:  [facility] , [place] [address] [phone]     [place] Office:  [address] [place] [address] [place], [place] [address]   [phone]     This progress note was completed using a voice transcription system. Every effort was made to ensure accuracy. However, inadvertent computerized transcription errors may be present.

## 2024-09-29 NOTE — PROGRESS NOTES
Patient transfer to room 2062. Writer called and gave report to Bessy. Patient transferred with all belongings

## 2024-09-29 NOTE — PLAN OF CARE
Problem: Discharge Planning  Goal: Discharge to home or other facility with appropriate resources  9/29/2024 0440 by Natalie Gonzalez RN  Outcome: Progressing     Problem: Pain  Goal: Verbalizes/displays adequate comfort level or baseline comfort level  9/29/2024 0440 by Natalie Gonzalez RN  Outcome: Progressing     Problem: Safety - Adult  Goal: Free from fall injury  9/29/2024 0440 by Natalie Gonzalez RN  Outcome: Progressing     Problem: Skin/Tissue Integrity  Goal: Absence of new skin breakdown  Description: 1.  Monitor for areas of redness and/or skin breakdown  2.  Assess vascular access sites hourly  3.  Every 4-6 hours minimum:  Change oxygen saturation probe site  4.  Every 4-6 hours:  If on nasal continuous positive airway pressure, respiratory therapy assess nares and determine need for appliance change or resting period.  9/29/2024 0440 by Natalie Gonzalez RN  Outcome: Progressing     Problem: Respiratory - Adult  Goal: Achieves optimal ventilation and oxygenation  9/29/2024 0440 by Natalie Gonzalez RN  Outcome: Progressing     Problem: Cardiovascular - Adult  Goal: Maintains optimal cardiac output and hemodynamic stability  9/29/2024 0440 by Natalie Gonzalez RN  Outcome: Progressing     Problem: Skin/Tissue Integrity - Adult  Goal: Skin integrity remains intact  9/29/2024 0440 by Natalie Gonzalez RN  Outcome: Progressing     Problem: Skin/Tissue Integrity - Adult  Goal: Incisions, wounds, or drain sites healing without S/S of infection  9/29/2024 0440 by Natalie Gonzalez RN  Outcome: Progressing     Problem: Gastrointestinal - Adult  Goal: Minimal or absence of nausea and vomiting  9/29/2024 0440 by Natalie Gonzalez RN  Outcome: Progressing     Problem: Gastrointestinal - Adult  Goal: Maintains or returns to baseline bowel function  9/29/2024 0440 by Natalie Gonzalez RN  Outcome: Progressing     Problem: ABCDS Injury Assessment  Goal: Absence of physical injury  9/29/2024 0440 by  Natalie Gonzalez, RN  Outcome: Progressing

## 2024-09-29 NOTE — PROGRESS NOTES
09/29/24 1412   Encounter Summary   Encounter Overview/Reason Attempted Encounter   Service Provided For Patient not available  (Pt with medical staff)

## 2024-09-29 NOTE — PLAN OF CARE
Problem: Pain  Goal: Verbalizes/displays adequate comfort level or baseline comfort level  9/29/2024 1556 by Mayank Arroyo RN  Outcome: Progressing, Pt educated on non-pharmacological pain interventions. PRN pain medications administered.        Problem: Safety - Adult  Goal: Free from fall injury  9/29/2024 1556 by Mayank Arroyo, RN  Outcome: Progressing, Patient remains free of incidence/ injury. Bed remains in low position. Side rails up x2.         Problem: Respiratory - Adult  Goal: Achieves optimal ventilation and oxygenation  9/29/2024 1556 by Mayank Arroyo, RN  Outcome: Progressing, Pulse ox monitoring continued. Sp02 maintained at > 90%. Pt denies SOB at this time.

## 2024-09-30 ENCOUNTER — APPOINTMENT (OUTPATIENT)
Age: 74
DRG: 189 | End: 2024-09-30
Payer: MEDICARE

## 2024-09-30 LAB
ANION GAP SERPL CALCULATED.3IONS-SCNC: ABNORMAL MMOL/L (ref 9–17)
ARTERIAL PATENCY WRIST A: ABNORMAL
BASOPHILS # BLD: 0 K/UL (ref 0–0.2)
BASOPHILS NFR BLD: 0 % (ref 0–2)
BUN SERPL-MCNC: 26 MG/DL (ref 8–23)
CALCIUM SERPL-MCNC: 9.8 MG/DL (ref 8.6–10.4)
CHLORIDE SERPL-SCNC: 93 MMOL/L (ref 98–107)
CO2 SERPL-SCNC: >45 MMOL/L (ref 20–31)
COHGB MFR BLD: 3.6 % (ref 0–5)
CREAT SERPL-MCNC: 0.7 MG/DL (ref 0.7–1.2)
ECHO AO ROOT DIAM: 3.5 CM
ECHO AO ROOT INDEX: 1.87 CM/M2
ECHO AV AREA PEAK VELOCITY: 2.9 CM2
ECHO AV AREA VTI: 3.2 CM2
ECHO AV AREA/BSA PEAK VELOCITY: 1.6 CM2/M2
ECHO AV AREA/BSA VTI: 1.7 CM2/M2
ECHO AV MEAN GRADIENT: 5 MMHG
ECHO AV MEAN VELOCITY: 1 M/S
ECHO AV PEAK GRADIENT: 9 MMHG
ECHO AV PEAK VELOCITY: 1.5 M/S
ECHO AV VELOCITY RATIO: 0.93
ECHO AV VTI: 31.5 CM
ECHO BSA: 1.87 M2
ECHO EST RA PRESSURE: 3 MMHG
ECHO LA AREA 2C: 17.5 CM2
ECHO LA AREA 4C: 13.7 CM2
ECHO LA DIAMETER INDEX: 1.87 CM/M2
ECHO LA DIAMETER: 3.5 CM
ECHO LA MAJOR AXIS: 5.4 CM
ECHO LA MINOR AXIS: 6 CM
ECHO LA TO AORTIC ROOT RATIO: 1
ECHO LA VOL BP: 35 ML (ref 18–58)
ECHO LA VOL MOD A2C: 42 ML (ref 18–58)
ECHO LA VOL MOD A4C: 26 ML (ref 18–58)
ECHO LA VOL/BSA BIPLANE: 19 ML/M2 (ref 16–34)
ECHO LA VOLUME INDEX MOD A2C: 22 ML/M2 (ref 16–34)
ECHO LA VOLUME INDEX MOD A4C: 14 ML/M2 (ref 16–34)
ECHO LV E' LATERAL VELOCITY: 8.3 CM/S
ECHO LV E' SEPTAL VELOCITY: 6 CM/S
ECHO LV EF PHYSICIAN: 60 %
ECHO LV FRACTIONAL SHORTENING: 33 % (ref 28–44)
ECHO LV INTERNAL DIMENSION DIASTOLE INDEX: 2.78 CM/M2
ECHO LV INTERNAL DIMENSION DIASTOLIC: 5.2 CM (ref 4.2–5.9)
ECHO LV INTERNAL DIMENSION SYSTOLIC INDEX: 1.87 CM/M2
ECHO LV INTERNAL DIMENSION SYSTOLIC: 3.5 CM
ECHO LV IVSD: 1.2 CM (ref 0.6–1)
ECHO LV MASS 2D: 248.8 G (ref 88–224)
ECHO LV MASS INDEX 2D: 133.1 G/M2 (ref 49–115)
ECHO LV POSTERIOR WALL DIASTOLIC: 1.2 CM (ref 0.6–1)
ECHO LV RELATIVE WALL THICKNESS RATIO: 0.46
ECHO LVOT AREA: 3.1 CM2
ECHO LVOT AV VTI INDEX: 1.02
ECHO LVOT DIAM: 2 CM
ECHO LVOT MEAN GRADIENT: 4 MMHG
ECHO LVOT PEAK GRADIENT: 8 MMHG
ECHO LVOT PEAK VELOCITY: 1.4 M/S
ECHO LVOT STROKE VOLUME INDEX: 53.7 ML/M2
ECHO LVOT SV: 100.5 ML
ECHO LVOT VTI: 32 CM
ECHO MV A VELOCITY: 1.14 M/S
ECHO MV AREA VTI: 2.9 CM2
ECHO MV E DECELERATION TIME (DT): 201 MS
ECHO MV E VELOCITY: 0.75 M/S
ECHO MV E/A RATIO: 0.66
ECHO MV E/E' LATERAL: 9.04
ECHO MV E/E' RATIO (AVERAGED): 10.77
ECHO MV E/E' SEPTAL: 12.5
ECHO MV LVOT VTI INDEX: 1.07
ECHO MV MAX VELOCITY: 1.1 M/S
ECHO MV MEAN GRADIENT: 1 MMHG
ECHO MV MEAN VELOCITY: 0.5 M/S
ECHO MV PEAK GRADIENT: 5 MMHG
ECHO MV VTI: 34.2 CM
ECHO RA AREA 4C: 13.5 CM2
ECHO RA END SYSTOLIC VOLUME APICAL 4 CHAMBER INDEX BSA: 16 ML/M2
ECHO RA VOLUME: 30 ML
ECHO RIGHT VENTRICULAR SYSTOLIC PRESSURE (RVSP): 13 MMHG
ECHO RV BASAL DIMENSION: 3.3 CM
ECHO RV TAPSE: 2.1 CM (ref 1.7–?)
ECHO TV REGURGITANT MAX VELOCITY: 1.58 M/S
ECHO TV REGURGITANT PEAK GRADIENT: 10 MMHG
EOSINOPHIL # BLD: 0 K/UL (ref 0–0.4)
EOSINOPHILS RELATIVE PERCENT: 0 % (ref 0–4)
ERYTHROCYTE [DISTWIDTH] IN BLOOD BY AUTOMATED COUNT: 16.3 % (ref 11.5–14.9)
GFR, ESTIMATED: >90 ML/MIN/1.73M2
GLUCOSE SERPL-MCNC: 194 MG/DL (ref 70–99)
HCO3 VENOUS: 50.8 MMOL/L (ref 24–30)
HCT VFR BLD AUTO: 27.6 % (ref 41–53)
HGB BLD-MCNC: 9 G/DL (ref 13.5–17.5)
LYMPHOCYTES NFR BLD: 0.55 K/UL (ref 1–4.8)
LYMPHOCYTES RELATIVE PERCENT: 4 % (ref 24–44)
MCH RBC QN AUTO: 30.3 PG (ref 26–34)
MCHC RBC AUTO-ENTMCNC: 32.5 G/DL (ref 31–37)
MCV RBC AUTO: 93.2 FL (ref 80–100)
METHEMOGLOBIN: 1.2 % (ref 0–1.9)
MONOCYTES NFR BLD: 0.82 K/UL (ref 0.1–1.3)
MONOCYTES NFR BLD: 6 % (ref 1–7)
MORPHOLOGY: ABNORMAL
MORPHOLOGY: ABNORMAL
NEUTROPHILS NFR BLD: 90 % (ref 36–66)
NEUTS SEG NFR BLD: 12.33 K/UL (ref 1.3–9.1)
O2 SAT, VEN: 66.4 % (ref 60–85)
PCO2 VENOUS: 94.5 MM HG (ref 39–55)
PH VENOUS: 7.34 (ref 7.32–7.42)
PLATELET # BLD AUTO: 297 K/UL (ref 150–450)
PMV BLD AUTO: 8.5 FL (ref 6–12)
PO2 VENOUS: 40.4 MM HG (ref 30–50)
POSITIVE BASE EXCESS, VEN: 25 MMOL/L (ref 0–2)
POTASSIUM SERPL-SCNC: 5.1 MMOL/L (ref 3.7–5.3)
RBC # BLD AUTO: 2.97 M/UL (ref 4.5–5.9)
SODIUM SERPL-SCNC: 142 MMOL/L (ref 135–144)
TEXT FOR RESPIRATORY: ABNORMAL
WBC OTHER # BLD: 13.7 K/UL (ref 3.5–11)

## 2024-09-30 PROCEDURE — 99232 SBSQ HOSP IP/OBS MODERATE 35: CPT | Performed by: INTERNAL MEDICINE

## 2024-09-30 PROCEDURE — 2580000003 HC RX 258

## 2024-09-30 PROCEDURE — 6370000000 HC RX 637 (ALT 250 FOR IP): Performed by: INTERNAL MEDICINE

## 2024-09-30 PROCEDURE — 94761 N-INVAS EAR/PLS OXIMETRY MLT: CPT

## 2024-09-30 PROCEDURE — 94640 AIRWAY INHALATION TREATMENT: CPT

## 2024-09-30 PROCEDURE — 94669 MECHANICAL CHEST WALL OSCILL: CPT

## 2024-09-30 PROCEDURE — 97116 GAIT TRAINING THERAPY: CPT

## 2024-09-30 PROCEDURE — 6360000002 HC RX W HCPCS: Performed by: NURSE PRACTITIONER

## 2024-09-30 PROCEDURE — 80048 BASIC METABOLIC PNL TOTAL CA: CPT

## 2024-09-30 PROCEDURE — 94660 CPAP INITIATION&MGMT: CPT

## 2024-09-30 PROCEDURE — 6370000000 HC RX 637 (ALT 250 FOR IP)

## 2024-09-30 PROCEDURE — 85025 COMPLETE CBC W/AUTO DIFF WBC: CPT

## 2024-09-30 PROCEDURE — 82800 BLOOD PH: CPT

## 2024-09-30 PROCEDURE — 36415 COLL VENOUS BLD VENIPUNCTURE: CPT

## 2024-09-30 PROCEDURE — 93306 TTE W/DOPPLER COMPLETE: CPT | Performed by: INTERNAL MEDICINE

## 2024-09-30 PROCEDURE — 2700000000 HC OXYGEN THERAPY PER DAY

## 2024-09-30 PROCEDURE — 2580000003 HC RX 258: Performed by: NURSE PRACTITIONER

## 2024-09-30 PROCEDURE — 1200000000 HC SEMI PRIVATE

## 2024-09-30 PROCEDURE — 94664 DEMO&/EVAL PT USE INHALER: CPT

## 2024-09-30 PROCEDURE — 93306 TTE W/DOPPLER COMPLETE: CPT

## 2024-09-30 PROCEDURE — 82805 BLOOD GASES W/O2 SATURATION: CPT

## 2024-09-30 RX ORDER — CARVEDILOL 3.12 MG/1
3.12 TABLET ORAL 2 TIMES DAILY
Status: DISCONTINUED | OUTPATIENT
Start: 2024-09-30 | End: 2024-10-01

## 2024-09-30 RX ORDER — BUSPIRONE HYDROCHLORIDE 5 MG/1
5 TABLET ORAL 3 TIMES DAILY
Status: DISCONTINUED | OUTPATIENT
Start: 2024-09-30 | End: 2024-10-02

## 2024-09-30 RX ORDER — GUAIFENESIN 600 MG/1
600 TABLET, EXTENDED RELEASE ORAL 2 TIMES DAILY
Status: DISCONTINUED | OUTPATIENT
Start: 2024-09-30 | End: 2024-10-04 | Stop reason: HOSPADM

## 2024-09-30 RX ORDER — DIAZEPAM 2 MG
2 TABLET ORAL 2 TIMES DAILY PRN
Status: DISCONTINUED | OUTPATIENT
Start: 2024-09-30 | End: 2024-10-01

## 2024-09-30 RX ADMIN — Medication 3 MG: at 21:33

## 2024-09-30 RX ADMIN — ATORVASTATIN CALCIUM 40 MG: 40 TABLET, FILM COATED ORAL at 17:52

## 2024-09-30 RX ADMIN — IPRATROPIUM BROMIDE AND ALBUTEROL SULFATE 1 DOSE: 2.5; .5 SOLUTION RESPIRATORY (INHALATION) at 05:26

## 2024-09-30 RX ADMIN — PANTOPRAZOLE SODIUM 40 MG: 40 TABLET, DELAYED RELEASE ORAL at 05:36

## 2024-09-30 RX ADMIN — SODIUM CHLORIDE, PRESERVATIVE FREE 10 ML: 5 INJECTION INTRAVENOUS at 21:36

## 2024-09-30 RX ADMIN — APIXABAN 5 MG: 5 TABLET, FILM COATED ORAL at 21:32

## 2024-09-30 RX ADMIN — CARVEDILOL 3.12 MG: 3.12 TABLET, FILM COATED ORAL at 08:34

## 2024-09-30 RX ADMIN — BUSPIRONE HYDROCHLORIDE 5 MG: 5 TABLET ORAL at 14:00

## 2024-09-30 RX ADMIN — IPRATROPIUM BROMIDE AND ALBUTEROL SULFATE 1 DOSE: 2.5; .5 SOLUTION RESPIRATORY (INHALATION) at 19:14

## 2024-09-30 RX ADMIN — WATER 40 MG: 1 INJECTION INTRAMUSCULAR; INTRAVENOUS; SUBCUTANEOUS at 14:01

## 2024-09-30 RX ADMIN — SODIUM CHLORIDE, PRESERVATIVE FREE 10 ML: 5 INJECTION INTRAVENOUS at 08:36

## 2024-09-30 RX ADMIN — GUAIFENESIN 600 MG: 600 TABLET, EXTENDED RELEASE ORAL at 08:34

## 2024-09-30 RX ADMIN — CARVEDILOL 3.12 MG: 3.12 TABLET, FILM COATED ORAL at 21:33

## 2024-09-30 RX ADMIN — BUSPIRONE HYDROCHLORIDE 5 MG: 5 TABLET ORAL at 08:34

## 2024-09-30 RX ADMIN — BUSPIRONE HYDROCHLORIDE 5 MG: 5 TABLET ORAL at 21:32

## 2024-09-30 RX ADMIN — WATER 40 MG: 1 INJECTION INTRAMUSCULAR; INTRAVENOUS; SUBCUTANEOUS at 03:12

## 2024-09-30 RX ADMIN — APIXABAN 5 MG: 5 TABLET, FILM COATED ORAL at 08:34

## 2024-09-30 RX ADMIN — GUAIFENESIN 600 MG: 600 TABLET, EXTENDED RELEASE ORAL at 21:33

## 2024-09-30 RX ADMIN — IPRATROPIUM BROMIDE AND ALBUTEROL SULFATE 1 DOSE: 2.5; .5 SOLUTION RESPIRATORY (INHALATION) at 10:48

## 2024-09-30 RX ADMIN — IPRATROPIUM BROMIDE AND ALBUTEROL SULFATE 1 DOSE: 2.5; .5 SOLUTION RESPIRATORY (INHALATION) at 07:32

## 2024-09-30 RX ADMIN — IPRATROPIUM BROMIDE AND ALBUTEROL SULFATE 1 DOSE: 2.5; .5 SOLUTION RESPIRATORY (INHALATION) at 14:52

## 2024-09-30 NOTE — PROGRESS NOTES
Physical Therapy  Facility/Department: [facility] MED SURG  Daily Treatment Note  NAME: Timothy Kern  : 1950  MRN: [id number]    Date of Service: 2024    Discharge Recommendations:  Patient would benefit from continued therapy after discharge        Patient Diagnosis(es): The primary encounter diagnosis was COPD exacerbation (HCC). Diagnoses of Pneumonia of right lower lobe due to infectious organism, Acute on chronic respiratory failure with hypoxia and hypercapnia, and Shortness of breath were also pertinent to this visit.    Activity Tolerance: Patient limited by endurance (sob c 3L 02)    Plan  Physical Therapy Plan  General Plan: 3-5 times per week    Restrictions  Restrictions/Precautions  Restrictions/Precautions: General Precautions, Contact Precautions, Fall Risk (MRSA)  Required Braces or Orthoses?: No  Implants present? :  (patient denies)  Position Activity Restriction  Other position/activity restrictions: up with assist, impulsive, monitor SP02 PRN- on 3L     Subjective   Pt sitting EOB upon entering room.  Pain: denies pain    Cognition  Overall Cognitive Status: Exceptions  Arousal/Alertness: Appears intact  Following Commands: Follows one step commands with increased time;Follows one step commands with repetition  Attention Span: Attends with cues to redirect  Memory: Impaired  Safety Judgement: Decreased awareness of need for assistance;Decreased awareness of need for safety  Problem Solving: Decreased awareness of errors;Assistance required to correct errors made;Assistance required to identify errors made;Assistance required to implement solutions;Assistance required to generate solutions  Insights: Decreased awareness of deficits  Initiation: Requires cues for some  Sequencing: Requires cues for some  Cognition Comment: patient demonstrating with impulsivity during session with overall decreased safety awareness and increased time for processing    Objective  Vitals  Pulse: 98  Heart Rate  needed standing up from a chair using your arms?: None  How much help is needed walking in hospital room?: A Little  How much help is needed climbing 3-5 steps with a railing?: A Little  AM-PAC Inpatient Mobility Raw Score : 21  AM-PAC Inpatient T-Scale Score : 50.25  Mobility Inpatient CMS 0-100% Score: 28.97  Mobility Inpatient CMS G-Code Modifier : CJ         Therapy Time   Individual Concurrent Group Co-treatment   Time In 1430         Time Out 1449         Minutes 19                 Monserrat Truong, PTA

## 2024-09-30 NOTE — PROGRESS NOTES
sat with pt. PT expressed grief over loss of independence and purpose in life. Frustration with medical system and assisted living facilities. Prayed with pt   09/30/24 4297   Encounter Summary   Encounter Overview/Reason Spiritual/Emotional Needs   Service Provided For Patient   Referral/Consult From Nurse   Support System Children   Last Encounter  09/30/24   Complexity of Encounter Moderate   Begin Time 1345   End Time  1359   Total Time Calculated 14 min   Spiritual/Emotional needs   Type Emotional Distress   Grief, Loss, and Adjustments   Type Adjustment to illness   Assessment/Intervention/Outcome   Assessment Complicated grieving;Despair;Sad   Intervention Active listening;Sustaining Presence/Ministry of presence;Prayer (assurance of)/[place]   Outcome Expressed feelings, needs, and concerns;Engaged in conversation;Grieving

## 2024-09-30 NOTE — PLAN OF CARE
Problem: Discharge Planning  Goal: Discharge to home or other facility with appropriate resources  9/29/2024 2000 by Petros Huang RN  Outcome: Progressing  Flowsheets (Taken 9/29/2024 2000)  Discharge to home or other facility with appropriate resources:   Arrange for needed discharge resources and transportation as appropriate   Identify barriers to discharge with patient and caregiver   Identify discharge learning needs (meds, wound care, etc)     Problem: Pain  Goal: Verbalizes/displays adequate comfort level or baseline comfort level  9/29/2024 2000 by Petros Huang RN  Outcome: Progressing  Flowsheets (Taken 9/29/2024 2000)  Verbalizes/displays adequate comfort level or baseline comfort level:   Encourage patient to monitor pain and request assistance   Assess pain using appropriate pain scale   Administer analgesics based on type and severity of pain and evaluate response   Implement non-pharmacological measures as appropriate and evaluate response     Problem: Safety - Adult  Goal: Free from fall injury  9/29/2024 2000 by Petros Huang RN  Outcome: Progressing  Flowsheets (Taken 9/29/2024 2000)  Free From Fall Injury: Instruct family/caregiver on patient safety     Problem: ABCDS Injury Assessment  Goal: Absence of physical injury  9/29/2024 2000 by Petros Huang RN  Outcome: Progressing  Flowsheets (Taken 9/29/2024 2000)  Absence of Physical Injury: Implement safety measures based on patient assessment

## 2024-09-30 NOTE — PROGRESS NOTES
[facility] PULMONARY,CRITICAL CARE & SLEEP   Benjiecindy Pardo MD/Giorgio HOLLINGSWORTH AGACNP-BC, NP-C      Abby HOLLINGSWORTH NP-C     Félix HOLLINGSWORTH NP-C                                          Pulmonary Progress Note    Patient - Timothy Kern   Age - 74 y.o.   - 1950  MRN - [id number]  Acct # - [id number]  Date of Admission - 2024 12:33 PM    Consulting Service/Physician:       Primary Care Physician: No primary care provider on file.    SUBJECTIVE:     Chief Complaint:   Chief Complaint   Patient presents with    Shortness of Breath     Subjective:    Timothy is seen sitting at the edge of the bed.  He is currently on 3 L which is his baseline oxygen.  Pulse ox is 92 to 95%.  He does get dyspneic with minimal exertion.  He was placed on BuSpar 5 mg 3 times a day per primary service.  He tells me he did wear the BiPAP last night and it was slightly more comfortable at the adjusted settings of 18/8.  He did have a ABG yesterday with initial CO2 of 85.  He was placed on BiPAP and it did improve to 71.6.  Venous gas was done this morning showing a pH of 7.338 with a CO2 of 94.  Bicarb on BMP is still over 45.  He is off Lasix.    VITALS  BP (!) 141/83   Pulse 88   Temp 98.4 °F (36.9 °C) (Oral)   Resp 18   Ht 1.753 m (5' 9.02\")   Wt 72.1 kg (159 lb)   SpO2 96%   BMI 23.47 kg/m²   Wt Readings from Last 3 Encounters:   24 72.1 kg (159 lb)     I/O (24 Hours)  No intake or output data in the 24 hours ending 24 1454  Ventilator:   Settings  FiO2 : 30 %  Insp Rise Time (%): 1 %  Exam:   Physical Exam   Constitutional: Elderly man sitting up in bed on 3 L in no acute distress  HENT: Unremarkable  Head: Normocephalic and atraumatic.   Eyes: EOM are normal. Pupils are equal, round, and reactive to light.   Neck: Neck supple.   Cardiovascular:  Regular rate and rhythm.  Normal heart tones.  No JVD.    Pulmonary/Chest: Mild exertional dyspnea, severely

## 2024-09-30 NOTE — PROGRESS NOTES
sulfate, ondansetron **OR** ondansetron, polyethylene glycol, acetaminophen **OR** acetaminophen    Data:     Past Medical History:   has a past medical history of COPD (chronic obstructive pulmonary disease) (HCC), HTN (hypertension), Hypoxia, On home O2, and Pneumonia.    Social History:   reports that he has never smoked. He has never used smokeless tobacco.     Family History: No family history on file.    Vitals:  BP (!) 141/83   Pulse 88   Temp 98.4 °F (36.9 °C) (Oral)   Resp 18   Ht 1.753 m (5' 9\")   Wt 72.4 kg (159 lb 9.8 oz)   SpO2 98%   BMI 23.57 kg/m²   Temp (24hrs), Av.4 °F (36.9 °C), Min:98.1 °F (36.7 °C), Max:98.6 °F (37 °C)    No results for input(s): \"POCGLU\" in the last 72 hours.    I/O(24Hr):    Intake/Output Summary (Last 24 hours) at 2024 0743  Last data filed at 2024 1325  Gross per 24 hour   Intake --   Output 750 ml   Net -750 ml       Labs:    [unfilled]    No results found for: \"SPECIAL\"  No results found for: \"CULTURE\"    [unfilled]    Radiology:    US LIVER    Result Date: 2024  EXAMINATION: RIGHT UPPER QUADRANT ULTRASOUND 2024 1:07 pm COMPARISON: CT 2024 HISTORY: ORDERING SYSTEM PROVIDED HISTORY: Right upper quadrant tenderness. TECHNOLOGIST PROVIDED HISTORY: Right upper quadrant tenderness. FINDINGS: LIVER:  The liver demonstrates normal echogenicity without evidence of intrahepatic biliary ductal dilatation.  By ultrasound no focal liver masses are seen.  Flow in the main portal vein is hepatopetal.  Liver length is 15.8 cm. BILIARY SYSTEM: Previous cholecystectomy.  Mildly prominent common bile duct measuring up to 8.35 mm, likely related to prior cholecystectomy. RIGHT KIDNEY: The right kidney is grossly unremarkable without evidence of hydronephrosis. PANCREAS:  Visualized portions of the pancreas are essentially unremarkable. There appears to be a tiny cyst in the region of the pancreatic head measuring up to 4.9 x 5.4 x 6.5 mm.  The pancreas  relate to scarring or stable.  No acute consolidation or pneumothorax.     Stable nonspecific airspace opacities in the right upper and lower lobes. This could relate to scarring.     XR ABDOMEN (KUB) (SINGLE AP VIEW)    Result Date: 9/24/2024  EXAMINATION: ONE SUPINE XRAY VIEW(S) OF THE ABDOMEN 9/24/2024 6:38 pm COMPARISON: None. HISTORY: ORDERING SYSTEM PROVIDED HISTORY: RUQ pain TECHNOLOGIST PROVIDED HISTORY: RUQ pain Reason for Exam: RUQ pain X several days FINDINGS: Lines and tubes: None. Bowel gas pattern: Nonobstructing bowel gas pattern. Abdomen and pelvis: No suspicious calcifications. Soft tissue organ contours: Surgical clips within the right upper quadrant. Heavy atherosclerotic calcification of the abdominal aorta and branch vessels.. Bones: No acute osseous findings.     Nonobstructive bowel gas pattern.     XR CHEST PORTABLE    Result Date: 9/24/2024  EXAMINATION: ONE XRAY VIEW OF THE CHEST 9/24/2024 9:31 am COMPARISON: None. HISTORY: ORDERING SYSTEM PROVIDED HISTORY: shortness of breath TECHNOLOGIST PROVIDED HISTORY: shortness of breath Reason for Exam: shortness of breath FINDINGS: Cardiac size is mildly enlarged.  Patchy infiltrate seen in the right mid and lower lung zones with underlying discoid atelectasis..  The pulmonary vascularity is hazy and indistinct. No pneumothorax.  No pleural effusions identified.  Posttraumatic deformity of the mid left clavicle.     1. Patchy infiltrate seen in the right mid and lower lung zones with underlying discoid atelectasis. 2. Mild cardiomegaly with mild pulmonary vascular congestion.         Physical Examination:        Physical Exam  Constitutional:       General: He is not in acute distress     Appearance: He is not ill-appearing   HENT:      Head: Normocephalic and atraumatic.      Nose: Nose normal.      Mouth/Throat:      Mouth: Mucous membranes are moist.   Eyes:      Extraocular Movements: Extraocular movements intact.      Pupils: Pupils are equal,

## 2024-09-30 NOTE — PROGRESS NOTES
[facility]  IN-PATIENT SERVICE  [facility]    PROGRESS NOTE             9/30/2024    9:19 AM    Name:   Timothy Kern  MRN:     [id number]     Acct:      [id number]   Room:   2062/2062-01   Day:  6  Admit Date:  9/24/2024 12:33 PM    PCP:  No primary care provider on file.  Code Status:  DNR-CCA    Subjective:     C/C:   Chief Complaint   Patient presents with    Shortness of Breath     Interval History Status: not changed.    The patient was seen and examined at bedside.  Vitally stable.  Overnight he had an episodes of bradycardia.  On examination breath sounds are still decreased bilaterally, no wheezing.    He used BiPAP last night.  Chronic CO2 retention.  pCO2 94.5, bicarb 50.8, pH 7.338.  Valium was discontinued last night, added BuSpar for his anxiety.  Using Acapella to clear his phlegm.    Decreases the dose of Coreg and Lasix.  Brief History:     The patient is a 74 y.o.  Unavailable / unknown male who presents withShortness of Breath   and he is admitted to the hospital for the management of COPD exacerbation.     He has a past medical history of COPD (on 3 L of oxygen at home), atrial fibrillation (on Eliquis), and congestive heart failure.  He was living in [place] and then moved to [place].  We do not have any medical records for him.  Today the patient was transferred from [facility] for shortness of breath.  Since the last 2 weeks, his breathing got worse at nursing facility.  There is no history of fever, chills, chest pain.  On examination, sounds were absent on the right side, diffuse wheezing on the left side.     On presentation, he was severely tachypneic (respiratory rate 34) and hypoxic.  EMS put him on CPAP, which helped him.  He does have a history of leg swelling, but there was no swelling on presentation.     ABG showed chronic CO2 retention.  ABGs showed chronic CO2 retention, pCO2 is 95, bicarb 52.5, almost normal pH.  Chest  quadrant abdominal pain  Right upper quadrant pain on palpation, some guarding present  History of cholecystectomy  X-ray KUB showed surgical clips in the right upper quadrant  Lipase and LFTs are normal  Ultrasound showed no acute abnormality,6.5 mm cyst in the region of the pancreatic head.     Anemia  Hemoglobin on presentation was 8.3  Iron studies are consistent with iron deficiency anemia  Started the patient on Ferrlecit (first dose 9/27)  Hemoglobin improved to 9 on 9/30    Anxiety  Was on Valium, discontinued because of respiratory depression  Added BuSpar     Congestive heart failure  No previous echo  On Coreg, Lasix and Lipitor     Atrial fibrillation  On Eliquis     DVT prophylaxis: On Eliquis  GI prophylaxis: Protonix     Plan will be discussed with the attending    Franklin Herzog MD  PGY I Transitional Year Resident  9/30/2024 9:19 AM        Attending Physician Statement  I have discussed the care of Timothycarey Kern with the resident team. I have examined the patient myself and taken ros and hpi , including pertinent history and exam findings,  with the resident. I have reviewed the key elements of all parts of the encounter with the resident.  I agree with the assessment, plan and orders as documented by the resident.    Principal Problem:    CAP (community acquired pneumonia) due to Chlamydia species  Resolved Problems:    * No resolved hospital problems. *    Patient condition unchanged  Awaiting placement  Currently on steroid taper per pulmonology  Did keep BiPAP on overnight  Pulmonology recommending stopping diazepam patient has been on 5 mg twice daily-will reduce to 2 mg twice daily over the next few days and then stop  Patient unhappy at above change but agrees to continue visit.  BuSpar 5 mg 3 times daily has been added for anxiety  Lung exam shows bilaterally reduced air entry but no significant wheeze      Electronically signed by Johanne Don MD

## 2024-10-01 LAB
ANION GAP SERPL CALCULATED.3IONS-SCNC: 2 MMOL/L (ref 9–17)
BASOPHILS # BLD: 0 K/UL (ref 0–0.2)
BASOPHILS NFR BLD: 0 % (ref 0–2)
BUN SERPL-MCNC: 24 MG/DL (ref 8–23)
CALCIUM SERPL-MCNC: 9.4 MG/DL (ref 8.6–10.4)
CHLORIDE SERPL-SCNC: 94 MMOL/L (ref 98–107)
CO2 SERPL-SCNC: 44 MMOL/L (ref 20–31)
CREAT SERPL-MCNC: 0.6 MG/DL (ref 0.7–1.2)
EOSINOPHIL # BLD: 0.13 K/UL (ref 0–0.4)
EOSINOPHILS RELATIVE PERCENT: 1 % (ref 0–4)
ERYTHROCYTE [DISTWIDTH] IN BLOOD BY AUTOMATED COUNT: 16.3 % (ref 11.5–14.9)
GFR, ESTIMATED: >90 ML/MIN/1.73M2
GLUCOSE SERPL-MCNC: 174 MG/DL (ref 70–99)
HCT VFR BLD AUTO: 26.8 % (ref 41–53)
HGB BLD-MCNC: 8.5 G/DL (ref 13.5–17.5)
LYMPHOCYTES NFR BLD: 0.65 K/UL (ref 1–4.8)
LYMPHOCYTES RELATIVE PERCENT: 5 % (ref 24–44)
MCH RBC QN AUTO: 29.6 PG (ref 26–34)
MCHC RBC AUTO-ENTMCNC: 31.8 G/DL (ref 31–37)
MCV RBC AUTO: 93.1 FL (ref 80–100)
MONOCYTES NFR BLD: 0.78 K/UL (ref 0.1–1.3)
MONOCYTES NFR BLD: 6 % (ref 1–7)
MORPHOLOGY: ABNORMAL
MORPHOLOGY: ABNORMAL
NEUTROPHILS NFR BLD: 88 % (ref 36–66)
NEUTS SEG NFR BLD: 11.44 K/UL (ref 1.3–9.1)
PLATELET # BLD AUTO: 337 K/UL (ref 150–450)
PMV BLD AUTO: 8.5 FL (ref 6–12)
POTASSIUM SERPL-SCNC: 5.1 MMOL/L (ref 3.7–5.3)
RBC # BLD AUTO: 2.88 M/UL (ref 4.5–5.9)
SODIUM SERPL-SCNC: 140 MMOL/L (ref 135–144)
WBC OTHER # BLD: 13 K/UL (ref 3.5–11)

## 2024-10-01 PROCEDURE — 6370000000 HC RX 637 (ALT 250 FOR IP): Performed by: INTERNAL MEDICINE

## 2024-10-01 PROCEDURE — 2700000000 HC OXYGEN THERAPY PER DAY

## 2024-10-01 PROCEDURE — 6370000000 HC RX 637 (ALT 250 FOR IP)

## 2024-10-01 PROCEDURE — 36415 COLL VENOUS BLD VENIPUNCTURE: CPT

## 2024-10-01 PROCEDURE — 94640 AIRWAY INHALATION TREATMENT: CPT

## 2024-10-01 PROCEDURE — 6360000002 HC RX W HCPCS: Performed by: NURSE PRACTITIONER

## 2024-10-01 PROCEDURE — 1200000000 HC SEMI PRIVATE

## 2024-10-01 PROCEDURE — 85025 COMPLETE CBC W/AUTO DIFF WBC: CPT

## 2024-10-01 PROCEDURE — 2580000003 HC RX 258

## 2024-10-01 PROCEDURE — 97530 THERAPEUTIC ACTIVITIES: CPT

## 2024-10-01 PROCEDURE — 80048 BASIC METABOLIC PNL TOTAL CA: CPT

## 2024-10-01 PROCEDURE — 97110 THERAPEUTIC EXERCISES: CPT

## 2024-10-01 PROCEDURE — 94761 N-INVAS EAR/PLS OXIMETRY MLT: CPT

## 2024-10-01 PROCEDURE — 94660 CPAP INITIATION&MGMT: CPT

## 2024-10-01 PROCEDURE — 99233 SBSQ HOSP IP/OBS HIGH 50: CPT | Performed by: INTERNAL MEDICINE

## 2024-10-01 PROCEDURE — 2580000003 HC RX 258: Performed by: NURSE PRACTITIONER

## 2024-10-01 PROCEDURE — 93005 ELECTROCARDIOGRAM TRACING: CPT | Performed by: EMERGENCY MEDICINE

## 2024-10-01 PROCEDURE — 97116 GAIT TRAINING THERAPY: CPT

## 2024-10-01 RX ORDER — PREDNISONE 20 MG/1
20 TABLET ORAL DAILY
Status: DISCONTINUED | OUTPATIENT
Start: 2024-10-05 | End: 2024-10-02

## 2024-10-01 RX ORDER — ATROPINE SULFATE 0.1 MG/ML
1 INJECTION INTRAVENOUS
Status: ACTIVE | OUTPATIENT
Start: 2024-10-01 | End: 2024-10-02

## 2024-10-01 RX ORDER — DIAZEPAM 2 MG
2 TABLET ORAL DAILY PRN
Status: DISCONTINUED | OUTPATIENT
Start: 2024-10-01 | End: 2024-10-02

## 2024-10-01 RX ORDER — PREDNISONE 10 MG/1
10 TABLET ORAL DAILY
Status: DISCONTINUED | OUTPATIENT
Start: 2024-10-08 | End: 2024-10-02

## 2024-10-01 RX ORDER — AMLODIPINE BESYLATE 5 MG/1
5 TABLET ORAL DAILY
Status: DISCONTINUED | OUTPATIENT
Start: 2024-10-01 | End: 2024-10-03

## 2024-10-01 RX ORDER — CARVEDILOL 3.12 MG/1
3.12 TABLET ORAL 2 TIMES DAILY WITH MEALS
Status: DISCONTINUED | OUTPATIENT
Start: 2024-10-01 | End: 2024-10-01

## 2024-10-01 RX ADMIN — IPRATROPIUM BROMIDE AND ALBUTEROL SULFATE 1 DOSE: 2.5; .5 SOLUTION RESPIRATORY (INHALATION) at 14:52

## 2024-10-01 RX ADMIN — BUSPIRONE HYDROCHLORIDE 5 MG: 5 TABLET ORAL at 08:17

## 2024-10-01 RX ADMIN — GUAIFENESIN 600 MG: 600 TABLET, EXTENDED RELEASE ORAL at 08:17

## 2024-10-01 RX ADMIN — Medication 3 MG: at 19:48

## 2024-10-01 RX ADMIN — BUSPIRONE HYDROCHLORIDE 5 MG: 5 TABLET ORAL at 19:48

## 2024-10-01 RX ADMIN — IPRATROPIUM BROMIDE AND ALBUTEROL SULFATE 1 DOSE: 2.5; .5 SOLUTION RESPIRATORY (INHALATION) at 06:45

## 2024-10-01 RX ADMIN — IPRATROPIUM BROMIDE AND ALBUTEROL SULFATE 1 DOSE: 2.5; .5 SOLUTION RESPIRATORY (INHALATION) at 11:14

## 2024-10-01 RX ADMIN — ATORVASTATIN CALCIUM 40 MG: 40 TABLET, FILM COATED ORAL at 17:55

## 2024-10-01 RX ADMIN — BUSPIRONE HYDROCHLORIDE 5 MG: 5 TABLET ORAL at 13:25

## 2024-10-01 RX ADMIN — SODIUM CHLORIDE, PRESERVATIVE FREE 10 ML: 5 INJECTION INTRAVENOUS at 19:49

## 2024-10-01 RX ADMIN — SODIUM CHLORIDE, PRESERVATIVE FREE 10 ML: 5 INJECTION INTRAVENOUS at 08:18

## 2024-10-01 RX ADMIN — APIXABAN 5 MG: 5 TABLET, FILM COATED ORAL at 08:17

## 2024-10-01 RX ADMIN — APIXABAN 5 MG: 5 TABLET, FILM COATED ORAL at 19:48

## 2024-10-01 RX ADMIN — WATER 40 MG: 1 INJECTION INTRAMUSCULAR; INTRAVENOUS; SUBCUTANEOUS at 13:25

## 2024-10-01 RX ADMIN — WATER 40 MG: 1 INJECTION INTRAMUSCULAR; INTRAVENOUS; SUBCUTANEOUS at 02:16

## 2024-10-01 RX ADMIN — SALINE NASAL SPRAY 1 SPRAY: 1.5 SOLUTION NASAL at 12:47

## 2024-10-01 RX ADMIN — IPRATROPIUM BROMIDE AND ALBUTEROL SULFATE 1 DOSE: 2.5; .5 SOLUTION RESPIRATORY (INHALATION) at 19:21

## 2024-10-01 RX ADMIN — AMLODIPINE BESYLATE 5 MG: 5 TABLET ORAL at 13:25

## 2024-10-01 RX ADMIN — PANTOPRAZOLE SODIUM 40 MG: 40 TABLET, DELAYED RELEASE ORAL at 08:17

## 2024-10-01 RX ADMIN — GUAIFENESIN 600 MG: 600 TABLET, EXTENDED RELEASE ORAL at 19:48

## 2024-10-01 ASSESSMENT — ENCOUNTER SYMPTOMS
COUGH: 0
CONSTIPATION: 0
DIARRHEA: 0
CHEST TIGHTNESS: 0
ABDOMINAL DISTENTION: 0
ABDOMINAL PAIN: 0

## 2024-10-01 ASSESSMENT — PAIN SCALES - GENERAL: PAINLEVEL_OUTOF10: 0

## 2024-10-01 NOTE — PLAN OF CARE
Problem: Pain  Goal: Verbalizes/displays adequate comfort level or baseline comfort level  Outcome: Progressing, Pt educated on non-pharmacological pain interventions. PRN pain medications administered.        Problem: Safety - Adult  Goal: Free from fall injury  Outcome: Progressing, Patient remains free of incidence/ injury. Bed remains in low position. Side rails up x2.      Problem: Respiratory - Adult  Goal: Achieves optimal ventilation and oxygenation  Outcome: Progressing, Pulse ox monitoring continued. Sp02 maintained at > 90%. Pt denies SOB at this time.

## 2024-10-01 NOTE — PROGRESS NOTES
[facility]   INPATIENT OCCUPATIONAL THERAPY  PROGRESS NOTE  Date: 10/1/2024  Patient Name: Timothy Kern       Room:   MRN: [id number]    : 1950  (74 y.o.)  Gender: male   Referring Practitioner: Franklin Herzog MD  Diagnosis: COPD exacerbation      Discharge Recommendations:  Further Occupational Therapy is recommended upon facility discharge.    OT Equipment Recommendations  Other: TBD    Restrictions/Precautions  Restrictions/Precautions  Restrictions/Precautions: General Precautions;Contact Precautions;Fall Risk (MRSA)  Required Braces or Orthoses?: No  Position Activity Restriction  Other position/activity restrictions: up with assist, impulsive, monitor SP02 PRN- on 3L    O2 Device: Nasal cannula (3L)  Comment: patient flucatuating between 83-98% with education on purse lip breathing throughout. Quick recovery after breathing techniques    Subjective  Subjective  Subjective: \"Thanks for teaching me how to breath\" in regards to education on purse lip breathing. Patient pleasant and agreeable to OT session.  Subjective  Pain: denies pain  Comments: OK per WILLARD Tiwari for OT session    Objective  Orientation  Overall Orientation Status: Within Functional Limits  Orientation Level: Oriented X4  Cognition  Overall Cognitive Status: Exceptions  Arousal/Alertness: Appears intact  Following Commands: Follows one step commands with increased time;Follows one step commands with repetition  Attention Span: Attends with cues to redirect  Memory: Impaired  Safety Judgement: Decreased awareness of need for assistance;Decreased awareness of need for safety  Problem Solving: Decreased awareness of errors;Assistance required to correct errors made;Assistance required to identify errors made;Assistance required to implement solutions;Assistance required to generate solutions  Insights: Decreased awareness of deficits  Initiation: Requires cues for some  Sequencing: Requires cues for

## 2024-10-01 NOTE — PROGRESS NOTES
Comprehensive Nutrition Assessment    Type and Reason for Visit:  Initial, RD Nutrition Re-Screen/LOS (LOS 7-days)    Nutrition Recommendations/Plan:   Continue current diet.     Malnutrition Assessment:  Malnutrition Status:  At risk for malnutrition (Comment) (Current medical condition) (10/01/24 8209)    Context:  Acute Illness     Findings of the 6 clinical characteristics of malnutrition:  Energy Intake:  No significant decrease in energy intake  Weight Loss:  No significant weight loss     Body Fat Loss:  No significant body fat loss     Muscle Mass Loss:  No significant muscle mass loss    Fluid Accumulation:  No significant fluid accumulation     Strength:  Not Performed    Nutrition Assessment:    Pt admitted for CAP, with PMH of COPD, CHF. Pt reports no issues with appetite. No wt hx available.    Nutrition Related Findings:    Labs: Gluc 174, WBC 13.0. No edema. BM 9/28. Duoneb, Prednisone. Wound Type: None       Current Nutrition Intake & Therapies:    Average Meal Intake: %  Average Supplements Intake: None Ordered  ADULT DIET; Regular; Low Fat/Low Chol/High Fiber/DEONNA    Anthropometric Measures:  Height: 175.3 cm (5' 9.02\")  Ideal Body Weight (IBW): 160 lbs (73 kg)    Admission Body Weight: 72.1 kg (158 lb 15.2 oz)  Current Body Weight: 72.1 kg (158 lb 15.2 oz), 99.3 % IBW. Weight Source: Stated  Current BMI (kg/m2): 23.5        Weight Adjustment For: No Adjustment                 BMI Categories: Normal Weight (BMI 22.0 to 24.9) age over 65    Estimated Daily Nutrient Needs:  Energy Requirements Based On: Formula  Weight Used for Energy Requirements: Current  Energy (kcal/day): 1386-2291 kcal/day based on [place]-St Jeor 1.2-1.3 factor  Weight Used for Protein Requirements: Current  Protein (g/day): 87-94 g/day based on 1.2-1.3 g/kg  Method Used for Fluid Requirements: 1 ml/kcal  Fluid (ml/day): or per physician    Nutrition Diagnosis:   Increased nutrient needs related to other (comment)  (Healing) as evidenced by other (comment) (Current medical condition)    Nutrition Interventions:   Food and/or Nutrient Delivery: Continue Current Diet  Nutrition Education/Counseling: No recommendation at this time  Coordination of Nutrition Care: Continue to monitor while inpatient       Goals:  Previous Goal Met:  (New goal)  Goals: Meet at least 75% of estimated needs       Nutrition Monitoring and Evaluation:   Behavioral-Environmental Outcomes: None Identified  Food/Nutrient Intake Outcomes: Food and Nutrient Intake  Physical Signs/Symptoms Outcomes: Biochemical Data, GI Status, Fluid Status or Edema, Skin, Weight    Discharge Planning:    Continue current diet     Zunilda Sheppard MS, LD  Contact: [phone]

## 2024-10-01 NOTE — CARE COORDINATION
Writer is following for potential discharge placement.    Writer placed call to Sherrie with [facility]. Facility has no LTC bed available for this pt.    Writer placed call to Katerina with [facility]. No answer, voicemail left for return call.    Writer placed call to Monserrat with [facility]. Facility denies this referral due to pt having out of state Medicaid.    Writer placed message to Madeline with [facility]. Facility accepts this pt.    Writer met with pt for update, agreeable to writer placing call to son Miles. Writer placed call to Miles for update on accepting facilities, Miles is agreeable to [facility] at this time. Writer placed call back to Madeline to confirm. Facility is currently attempting to verify pt insurance, coming up as [place] benefits.  Electronically signed by GERARDO Ko on 10/1/2024 at 3:18 PM    Writer received message from Madeline facility is still able to accept this pt, sorted out insurance issues. Will need authorization to admit to facility.   Electronically signed by GERARDO Ko on 10/1/2024 at 5:25 PM

## 2024-10-01 NOTE — PLAN OF CARE
Problem: Discharge Planning  Goal: Discharge to home or other facility with appropriate resources  Outcome: Progressing  Flowsheets (Taken 9/30/2024 2357)  Discharge to home or other facility with appropriate resources:   Identify barriers to discharge with patient and caregiver   Arrange for needed discharge resources and transportation as appropriate   Identify discharge learning needs (meds, wound care, etc)   Arrange for interpreters to assist at discharge as needed   Refer to discharge planning if patient needs post-hospital services based on physician order or complex needs related to functional status, cognitive ability or social support system     Problem: Pain  Goal: Verbalizes/displays adequate comfort level or baseline comfort level  Outcome: Progressing  Flowsheets (Taken 9/30/2024 2357)  Verbalizes/displays adequate comfort level or baseline comfort level:   Encourage patient to monitor pain and request assistance   Assess pain using appropriate pain scale   Administer analgesics based on type and severity of pain and evaluate response   Implement non-pharmacological measures as appropriate and evaluate response   Consider cultural and social influences on pain and pain management   Notify Licensed Independent Practitioner if interventions unsuccessful or patient reports new pain     Problem: Safety - Adult  Goal: Free from fall injury  Outcome: Progressing  Flowsheets (Taken 9/30/2024 2357)  Free From Fall Injury: Instruct family/caregiver on patient safety     Problem: Skin/Tissue Integrity  Goal: Absence of new skin breakdown  Description: 1.  Monitor for areas of redness and/or skin breakdown  2.  Assess vascular access sites hourly  3.  Every 4-6 hours minimum:  Change oxygen saturation probe site  4.  Every 4-6 hours:  If on nasal continuous positive airway pressure, respiratory therapy assess nares and determine need for appliance change or resting period.  Outcome: Progressing  Note:  Monitoring skin     Problem: Respiratory - Adult  Goal: Achieves optimal ventilation and oxygenation  Outcome: Progressing  Flowsheets (Taken 9/30/2024 2357)  Achieves optimal ventilation and oxygenation:   Assess for changes in respiratory status   Assess for changes in mentation and behavior   Position to facilitate oxygenation and minimize respiratory effort   Oxygen supplementation based on oxygen saturation or arterial blood gases   Respiratory therapy support as indicated   Assess and instruct to report shortness of breath or any respiratory difficulty     Problem: Cardiovascular - Adult  Goal: Maintains optimal cardiac output and hemodynamic stability  Outcome: Progressing  Flowsheets (Taken 9/30/2024 2357)  Maintains optimal cardiac output and hemodynamic stability:   Monitor blood pressure and heart rate   Monitor urine output and notify Licensed Independent Practitioner for values outside of normal range   Assess for signs of decreased cardiac output     Problem: Skin/Tissue Integrity - Adult  Goal: Skin integrity remains intact  Outcome: Progressing  Flowsheets (Taken 9/30/2024 2357)  Skin Integrity Remains Intact: Monitor for areas of redness and/or skin breakdown  Goal: Incisions, wounds, or drain sites healing without S/S of infection  Outcome: Progressing  Flowsheets (Taken 9/30/2024 2357)  Incisions, Wounds, or Drain Sites Healing Without Sign and Symptoms of Infection: TWICE DAILY: Assess and document skin integrity     Problem: Gastrointestinal - Adult  Goal: Minimal or absence of nausea and vomiting  Outcome: Progressing  Flowsheets (Taken 9/30/2024 2357)  Minimal or absence of nausea and vomiting: Administer IV fluids as ordered to ensure adequate hydration  Goal: Maintains or returns to baseline bowel function  Outcome: Progressing  Flowsheets (Taken 9/30/2024 2357)  Maintains or returns to baseline bowel function: Assess bowel function     Problem: ABCDS Injury Assessment  Goal: Absence of

## 2024-10-01 NOTE — PROGRESS NOTES
[facility]  IN-PATIENT SERVICE  [facility]    PROGRESS NOTE             10/1/2024    7:25 AM    Name:   Timothy Kenr  MRN:     [id number]     Acct:      [id number]   Room:   2062/2062-01   Day:  7  Admit Date:  9/24/2024 12:33 PM    PCP:  No primary care provider on file.  Code Status:  DNR-CCA    Subjective:     C/C:   Chief Complaint   Patient presents with    Shortness of Breath     Interval History Status: not changed.    Patient was seen and examined at the bedside.  He is awake, alert, oriented. He is vitally stable.  Patient had no overnight complaints. Per RN note this morning, patient was noted to be becoming bradycardic.  Morning Coreg dose was held.     Per pulmonology note by patient was counseled on the importance of BiPAP usage.  Per Pulmonology  recommendation wean oxygen but keep pulse ox of oxygen above 89%. CO2 is down trending from >45 to 44 today.  Glucose 174, BUN 24.  WBC today 13.0 from 13.7.    Brief History:     The patient is a 74 y.o.  Unavailable / unknown male who presents withShortness of Breath   and he is admitted to the hospital for the management of COPD exacerbation.     He has a past medical history of COPD (on 3 L of oxygen at home), atrial fibrillation (on Eliquis), and congestive heart failure.  He was living in [place] and then moved to [place].  We do not have any medical records for him.  Today the patient was transferred from [facility] for shortness of breath.  Since the last 2 weeks, his breathing got worse at nursing facility.  There is no history of fever, chills, chest pain.  On examination, sounds were absent on the right side, diffuse wheezing on the left side.     On presentation, he was severely tachypneic (respiratory rate 34) and hypoxic.  EMS put him on CPAP, which helped him.  He does have a history of leg swelling, but there was no swelling on presentation.     ABG showed chronic CO2 retention.  ABGs  distension.      Tenderness: There is no abdominal tenderness. There is no guarding.   Musculoskeletal:      Right lower leg: No edema.      Left lower leg: No edema.   Skin:     General: Skin is warm.   Neurological:      General: No focal deficit present.      Mental Status: He is alert and oriented to person, place, and time.          Mental Status: He is alert and oriented to person, place, and time.     Assessment:        Primary Problem  CAP (community acquired pneumonia) due to Chlamydia species    Active Hospital Problems    Diagnosis Date Noted    CAP (community acquired pneumonia) due to Chlamydia species [J16.0] 09/24/2024       Plan:        Patient status Admit as inpatient in the  Medical ICU     Acute on chronic hypercapnic respiratory failure vs healthcare associated pneumonia  Presented with shortness of breath, requiring noninvasive ventilation (on BiPAP)  Hypoxic on presentation SpO2 83%  On chronic oxygen 3 L at nursing facility, baseline oxygen requirement  On DuoNeb  IV methylprednisolone 40 mg every 12 hours  Pneumonia workup is negative, MRSA positive  Pro-Star is negative  Lactic acid is normal range  ABGs on presentation showed chronic CO2 retention.  pCO2 of 94, bicarb 52.5  VBG's on 9/25/2024 showed pCO2 of 58, bicarb 46.4 and pH 7.512  ABGs on 9/30/2024 showed pCO2 of 94.5, bicarb 50.8, pH 7.33  Cefepime and vancomycin discontinued, started him on doxycycline (last dose on 9/26).  CT scan showed to 2.2 cm of lung nodules, less likely malignancy as per pulmonology follow-up with CT at 4 months and PFTs.   PT/OT  Noninvasive ventilation as needed  Per pulmonology: plan on intermittently placing on his BiPAP throughout the day for 30 minutes to an hour as tolerated,Should wear the BiPAP when he is napping and overnight. Repeat BMP in AM.  Repeat BMP (10/1): Cl 94, HCO3 44, Glucose 174     Right upper quadrant abdominal pain  Right upper quadrant pain on palpation, some guarding

## 2024-10-01 NOTE — PROGRESS NOTES
[facility] PULMONARY,CRITICAL CARE & SLEEP   Benjiecindy Pardo MD/Giorgio HOLLINGSWORTH AGAMAGYP-BC, NP-C      Abby HOLLINGSWORTH NP-C     Félix HOLLINGSWORTH NP-C                                          Pulmonary Progress Note    Patient - Timothy Kern   Age - 74 y.o.   - 1950  MRN - [id number]  Acct # - [id number]  Date of Admission - 2024 12:33 PM    Consulting Service/Physician:       Primary Care Physician: No primary care provider on file.    SUBJECTIVE:     Chief Complaint:   Chief Complaint   Patient presents with    Shortness of Breath     Subjective:    Timothy is seen lying in bed.  He states his breathing is slightly better today.  Bicarb is trending down and is 44 today.  He has been afebrile.  He tells me he is tolerating the BiPAP at night and with naps.  He does report a congested cough.  He does have an incentive spirometer and flutter valve at bedside.  He is on 3 L which is his baseline oxygen with pulse ox 96%.  White blood cell count is downtrending.      VITALS  /79   Pulse 54   Temp 98.7 °F (37.1 °C) (Oral)   Resp 17   Ht 1.753 m (5' 9.02\")   Wt 72.1 kg (159 lb)   SpO2 96%   BMI 23.47 kg/m²   Wt Readings from Last 3 Encounters:   24 72.1 kg (159 lb)     I/O (24 Hours)    Intake/Output Summary (Last 24 hours) at 10/1/2024 1258  Last data filed at 10/1/2024 1027  Gross per 24 hour   Intake --   Output 475 ml   Net -475 ml     Ventilator:   Settings  FiO2 : 35 %  Insp Rise Time (%): 1 %  Exam:   Physical Exam   Constitutional: Elderly man lying in bed on 3 L in no acute distress  HENT: Unremarkable  Head: Normocephalic and atraumatic.   Eyes: EOM are normal. Pupils are equal, round, and reactive to light.   Neck: Neck supple.   Cardiovascular:  Regular rate and rhythm.  Normal heart tones.  No JVD.    Pulmonary/Chest: Mild exertional dyspnea, severely diminished throughout, on 3 L with pulse ox 96%   Abdominal: Soft. Bowel sounds are  lung scars and nodular opacities, malignancy felt to be less likely but not totally excluded  Subcarinal mediastinal adenopathy  Ascending aortic aneurysm 4.4 cm stable since last year  Chronic debility  History of atrial fibrillation per medical record, on Eliquis  MRSA colonizer positive nasal swab but no signs of active infection  DNR CC arrest/DO NOT INTUBATE  PLAN:   Continue BiPAP at night and with naps.  May use as needed during the day.  Discussed at bedside importance of BiPAP use.  Wean oxygen, keep pulse ox above 89%  Continue bronchodilators  Transition to prednisone with taper tomorrow  Will need follow-up CT of the chest in 6 to 8 weeks  Would benefit from outpatient PFT/pulmonary follow-up after discharge, [phone]  Encourage flutter valve and I-S use      Electronically signed by EDEL Merrill CNP on 10/01/24     This progress note was completed using a voice transcription system. Every effort was made to ensure accuracy. However, inadvertent computerized transcription errors may be present.    Abby Donald, NP-C, MSN  [facility]

## 2024-10-01 NOTE — CONSULTS
Cardiology Consultation         Date:   10/1/2024  Patient name: Timothy Kern  Date of admission:  9/24/2024 12:33 PM  MRN:   [id number]  YOB: 1950    Reason for Admission: acute on chronic hypoxic resp failure     Chief Complaint: worsening dyspnea     History of present illness:     Patient with known history of paroxysmal atrial fibrillation on DOAC,chronic hypoxic respiratory failure secondary to COPD and congestive heart failure, recently moved to town, admitted with worsening dyspnea, found to be in acute on chronic hypoxic respiratory failure, pulmonology following, currently feeling improved, denies any chest pain or angina.  Overnight he was found to be bradycardic with heart rate in 40s.  ECG showed marked sinus bradycardia with PAC.  No acute ST-T abnormality noted. TTE repeated this admission showed preserved LVEF.      Past Medical History:   has a past medical history of COPD (chronic obstructive pulmonary disease) (HCC), HTN (hypertension), Hypoxia, On home O2, and Pneumonia.    Past Surgical History:   has no past surgical history on file.     Home Medications:    Prior to Admission medications    Medication Sig Start Date End Date Taking? Authorizing Provider   acetaminophen (TYLENOL) 325 MG tablet Take 2 tablets by mouth every 6 hours as needed for Pain or Fever (Temperature > 101F) Do not exceed 3g/24 hours   Yes Marcus Matias MD   fluticasone-salmeterol (ADVAIR DISKUS) 500-50 MCG/ACT AEPB diskus inhaler Inhale 1 puff into the lungs 2 times daily   Yes Marcus Matias MD   albuterol sulfate HFA (VENTOLIN HFA) 108 (90 Base) MCG/ACT inhaler Inhale 2 puffs into the lungs every 6 hours as needed for Wheezing   Yes Marcus Matias MD   atorvastatin (LIPITOR) 40 MG tablet Take 1 tablet by mouth every evening   Yes Marcus Matias MD   calcium carbonate (TUMS) 500 MG chewable tablet Take 2 tablets by mouth every 6 hours as needed for Heartburn   Yes

## 2024-10-01 NOTE — PROGRESS NOTES
Attending Physician Statement  I have discussed the care of Timothy Kern with the resident team. I have examined the patient myself and taken ros and hpi , including pertinent history and exam findings,  with the resident. I have reviewed the key elements of all parts of the encounter with the resident.  I agree with the assessment, plan and orders as documented by the resident.     Principal Problem:    CAP (community acquired pneumonia) due to Chlamydia species  Resolved Problems:    * No resolved hospital problems. *     Patient condition unchanged  Awaiting placement  Currently on steroid taper per pulmonology  Did keep BiPAP on overnight  Pulmonology recommending stopping diazepam patient has been on 5 mg twice daily-will reduce to 2 mg twice daily over the next few days and then stop  Patient unhappy at above change but agrees to continue .  BuSpar 5 mg 3 times daily has been added for anxiety  Lung exam shows bilaterally reduced air entry but no significant wheeze    10/1  Patient became bradycardic overnight-a.m. dose of Coreg was held  This morning heart rate more than 100 on exam hence low-dose Coreg resumed  Cardiology was consulted  Remains on IV steroid per pulmonology recommendation  Labs satisfactory WBC downtrending, hemoglobin stable  Subjectively doing well no new concerns  Air entry bilaterally reduced no wheeze     Electronically signed by Johanne Don MD

## 2024-10-02 PROBLEM — R00.1 BRADYCARDIA: Status: ACTIVE | Noted: 2024-10-02

## 2024-10-02 PROBLEM — J96.21 ACUTE ON CHRONIC RESPIRATORY FAILURE WITH HYPOXEMIA: Status: ACTIVE | Noted: 2024-10-02

## 2024-10-02 PROBLEM — I48.0 PAROXYSMAL ATRIAL FIBRILLATION (HCC): Status: ACTIVE | Noted: 2024-10-02

## 2024-10-02 LAB
ANION GAP SERPL CALCULATED.3IONS-SCNC: 3 MMOL/L (ref 9–17)
BASOPHILS # BLD: 0 K/UL (ref 0–0.2)
BASOPHILS NFR BLD: 0 % (ref 0–2)
BUN SERPL-MCNC: 23 MG/DL (ref 8–23)
CALCIUM SERPL-MCNC: 9 MG/DL (ref 8.6–10.4)
CHLORIDE SERPL-SCNC: 95 MMOL/L (ref 98–107)
CO2 SERPL-SCNC: 44 MMOL/L (ref 20–31)
CREAT SERPL-MCNC: 0.5 MG/DL (ref 0.7–1.2)
EOSINOPHIL # BLD: 0.14 K/UL (ref 0–0.4)
EOSINOPHILS RELATIVE PERCENT: 1 % (ref 0–4)
ERYTHROCYTE [DISTWIDTH] IN BLOOD BY AUTOMATED COUNT: 16.4 % (ref 11.5–14.9)
GFR, ESTIMATED: >90 ML/MIN/1.73M2
GLUCOSE SERPL-MCNC: 116 MG/DL (ref 70–99)
HCT VFR BLD AUTO: 26.5 % (ref 41–53)
HGB BLD-MCNC: 8.5 G/DL (ref 13.5–17.5)
LYMPHOCYTES NFR BLD: 1.94 K/UL (ref 1–4.8)
LYMPHOCYTES RELATIVE PERCENT: 14 % (ref 24–44)
MCH RBC QN AUTO: 29.8 PG (ref 26–34)
MCHC RBC AUTO-ENTMCNC: 32.2 G/DL (ref 31–37)
MCV RBC AUTO: 92.5 FL (ref 80–100)
MONOCYTES NFR BLD: 0.69 K/UL (ref 0.1–1.3)
MONOCYTES NFR BLD: 5 % (ref 1–7)
MORPHOLOGY: ABNORMAL
NEUTROPHILS NFR BLD: 80 % (ref 36–66)
NEUTS SEG NFR BLD: 11.05 K/UL (ref 1.3–9.1)
NUCLEATED RED BLOOD CELLS: 2 PER 100 WBC
PLATELET # BLD AUTO: 322 K/UL (ref 150–450)
PMV BLD AUTO: 8.3 FL (ref 6–12)
POTASSIUM SERPL-SCNC: 4.1 MMOL/L (ref 3.7–5.3)
RBC # BLD AUTO: 2.87 M/UL (ref 4.5–5.9)
SODIUM SERPL-SCNC: 142 MMOL/L (ref 135–144)
WBC OTHER # BLD: 13.8 K/UL (ref 3.5–11)

## 2024-10-02 PROCEDURE — 6370000000 HC RX 637 (ALT 250 FOR IP): Performed by: INTERNAL MEDICINE

## 2024-10-02 PROCEDURE — 94660 CPAP INITIATION&MGMT: CPT

## 2024-10-02 PROCEDURE — 80048 BASIC METABOLIC PNL TOTAL CA: CPT

## 2024-10-02 PROCEDURE — 36415 COLL VENOUS BLD VENIPUNCTURE: CPT

## 2024-10-02 PROCEDURE — 94640 AIRWAY INHALATION TREATMENT: CPT

## 2024-10-02 PROCEDURE — 2580000003 HC RX 258

## 2024-10-02 PROCEDURE — 6370000000 HC RX 637 (ALT 250 FOR IP): Performed by: NURSE PRACTITIONER

## 2024-10-02 PROCEDURE — 94761 N-INVAS EAR/PLS OXIMETRY MLT: CPT

## 2024-10-02 PROCEDURE — 99233 SBSQ HOSP IP/OBS HIGH 50: CPT | Performed by: INTERNAL MEDICINE

## 2024-10-02 PROCEDURE — 6370000000 HC RX 637 (ALT 250 FOR IP)

## 2024-10-02 PROCEDURE — 2700000000 HC OXYGEN THERAPY PER DAY

## 2024-10-02 PROCEDURE — 1200000000 HC SEMI PRIVATE

## 2024-10-02 PROCEDURE — 85025 COMPLETE CBC W/AUTO DIFF WBC: CPT

## 2024-10-02 RX ORDER — BUSPIRONE HYDROCHLORIDE 15 MG/1
15 TABLET ORAL 3 TIMES DAILY
Status: DISCONTINUED | OUTPATIENT
Start: 2024-10-02 | End: 2024-10-04 | Stop reason: HOSPADM

## 2024-10-02 RX ORDER — PREDNISONE 20 MG/1
20 TABLET ORAL DAILY
Status: DISCONTINUED | OUTPATIENT
Start: 2024-10-08 | End: 2024-10-04 | Stop reason: HOSPADM

## 2024-10-02 RX ORDER — PREDNISONE 20 MG/1
40 TABLET ORAL DAILY
Status: COMPLETED | OUTPATIENT
Start: 2024-10-03 | End: 2024-10-04

## 2024-10-02 RX ORDER — PREDNISONE 10 MG/1
10 TABLET ORAL ONCE
Status: COMPLETED | OUTPATIENT
Start: 2024-10-02 | End: 2024-10-02

## 2024-10-02 RX ORDER — ATROPINE SULFATE 0.1 MG/ML
1 INJECTION INTRAVENOUS
Status: ACTIVE | OUTPATIENT
Start: 2024-10-02 | End: 2024-10-03

## 2024-10-02 RX ORDER — PREDNISONE 10 MG/1
10 TABLET ORAL DAILY
Status: DISCONTINUED | OUTPATIENT
Start: 2024-10-11 | End: 2024-10-04 | Stop reason: HOSPADM

## 2024-10-02 RX ADMIN — ATORVASTATIN CALCIUM 40 MG: 40 TABLET, FILM COATED ORAL at 17:50

## 2024-10-02 RX ADMIN — SODIUM CHLORIDE, PRESERVATIVE FREE 10 ML: 5 INJECTION INTRAVENOUS at 20:13

## 2024-10-02 RX ADMIN — GUAIFENESIN 600 MG: 600 TABLET, EXTENDED RELEASE ORAL at 20:12

## 2024-10-02 RX ADMIN — Medication 3 MG: at 20:12

## 2024-10-02 RX ADMIN — IPRATROPIUM BROMIDE AND ALBUTEROL SULFATE 1 DOSE: 2.5; .5 SOLUTION RESPIRATORY (INHALATION) at 14:38

## 2024-10-02 RX ADMIN — AMLODIPINE BESYLATE 5 MG: 5 TABLET ORAL at 08:21

## 2024-10-02 RX ADMIN — BUSPIRONE HYDROCHLORIDE 15 MG: 15 TABLET ORAL at 20:12

## 2024-10-02 RX ADMIN — PREDNISONE 30 MG: 20 TABLET ORAL at 08:20

## 2024-10-02 RX ADMIN — BUSPIRONE HYDROCHLORIDE 15 MG: 15 TABLET ORAL at 15:27

## 2024-10-02 RX ADMIN — SERTRALINE 50 MG: 50 TABLET, FILM COATED ORAL at 12:59

## 2024-10-02 RX ADMIN — BUSPIRONE HYDROCHLORIDE 5 MG: 5 TABLET ORAL at 08:21

## 2024-10-02 RX ADMIN — APIXABAN 5 MG: 5 TABLET, FILM COATED ORAL at 20:12

## 2024-10-02 RX ADMIN — SODIUM CHLORIDE, PRESERVATIVE FREE 10 ML: 5 INJECTION INTRAVENOUS at 08:25

## 2024-10-02 RX ADMIN — IPRATROPIUM BROMIDE AND ALBUTEROL SULFATE 1 DOSE: 2.5; .5 SOLUTION RESPIRATORY (INHALATION) at 10:36

## 2024-10-02 RX ADMIN — GUAIFENESIN 600 MG: 600 TABLET, EXTENDED RELEASE ORAL at 08:20

## 2024-10-02 RX ADMIN — IPRATROPIUM BROMIDE AND ALBUTEROL SULFATE 1 DOSE: 2.5; .5 SOLUTION RESPIRATORY (INHALATION) at 19:30

## 2024-10-02 RX ADMIN — APIXABAN 5 MG: 5 TABLET, FILM COATED ORAL at 08:20

## 2024-10-02 RX ADMIN — PANTOPRAZOLE SODIUM 40 MG: 40 TABLET, DELAYED RELEASE ORAL at 05:12

## 2024-10-02 RX ADMIN — IPRATROPIUM BROMIDE AND ALBUTEROL SULFATE 1 DOSE: 2.5; .5 SOLUTION RESPIRATORY (INHALATION) at 06:17

## 2024-10-02 RX ADMIN — PREDNISONE 10 MG: 10 TABLET ORAL at 15:27

## 2024-10-02 NOTE — PROGRESS NOTES
Cardiology Progress Note                     Date:   10/2/2024  Patient name: Timothy Kern  Date of admission:  9/24/2024 12:33 PM  MRN:   [id number]  YOB: 1950  PCP: No primary care provider on file.    Reason for Admission:  acute on chronic hypoxic resp failure     Subjective:       Patient's heart rate is overall improved, ranging 80s while awake, did have an episode of heart rate in mid 40s while sleeping earlier this a.m., was on NIPPV at that time, continues to report significant dyspnea, on 3 L nasal cannula oxygen    Scheduled Meds:   amLODIPine  5 mg Oral Daily    predniSONE  30 mg Oral Daily    [START ON 10/5/2024] predniSONE  20 mg Oral Daily    [START ON 10/8/2024] predniSONE  10 mg Oral Daily    busPIRone  5 mg Oral TID    guaiFENesin  600 mg Oral BID    ipratropium 0.5 mg-albuterol 2.5 mg  1 Dose Inhalation Q4H WA RT    apixaban  5 mg Oral BID    atorvastatin  40 mg Oral QPM    sodium chloride flush  5-40 mL IntraVENous 2 times per day    pantoprazole  40 mg Oral QAM AC    melatonin  3 mg Oral Nightly       Continuous Infusions:   sodium chloride         Labs:     CBC:   Recent Labs     09/30/24  0615 10/01/24  0541 10/02/24  0547   WBC 13.7* 13.0* 13.8*   HGB 9.0* 8.5* 8.5*    337 322     BMP:    Recent Labs     09/30/24  0615 10/01/24  0541 10/02/24  0547    140 142   K 5.1 5.1 4.1   CL 93* 94* 95*   CO2 >45* 44* 44*   BUN 26* 24* 23   CREATININE 0.7 0.6* 0.5*   GLUCOSE 194* 174* 116*     Hepatic: No results for input(s): \"AST\", \"ALT\", \"BILITOT\", \"ALKPHOS\" in the last 72 hours.    Invalid input(s): \"ALB\"  Troponin: No results for input(s): \"TROPONINI\" in the last 72 hours.  BNP: No results for input(s): \"BNP\" in the last 72 hours.  Lipids: No results for input(s): \"CHOL\", \"HDL\" in the last 72 hours.    Invalid input(s): \"LDLCALCU\"  INR: No results for input(s): \"INR\" in the last 72 hours.      Objective:     Vitals: /77   Pulse 96   Temp 97.5 °F  (36.4 °C)   Resp 18   Ht 1.753 m (5' 9.02\")   Wt 72.1 kg (159 lb)   SpO2 98%   BMI 23.47 kg/m²     General appearance: awake, alert, in no apparent respiratory distress on 3 L nasal cannula oxygen  HEENT: Head: Normocephalic, no lesions, without obvious abnormality  Neck: no JVD  Lungs: Diminished air entry bilaterally with scattered rhonchi  Heart: regular rate and rhythm, S1, S2 normal, no murmur, click, rub or gallop  Abdomen: soft, non-tender; bowel sounds normal  Extremities: No LE edema  Neurologic: Mental status: Alert, oriented. Motor and sensory not done.       Cardiac testing:     EKG:  Normal sinus rhythm, NORMAL ECG ON 9/24/24        TTE 9/30/2024    Left Ventricle: Normal left ventricular systolic function with a visually estimated EF of 55 - 60%. Left ventricle size is normal. Mildly increased wall thickness. Unable to assess wall motion. Normal diastolic function.    Image quality is adequate.    Impression:   Sinus bradycardia  Acute on chronic hypoxemic respiratory failure  COPD with exacerbation  Chronic active smoker  Reported history of paroxysmal atrial fibrillation on DOAC  Heart failure with preserved ejection fraction  Pulmonary cachexia    Patient Active Problem List:     CAP (community acquired pneumonia) due to Chlamydia species        Plan:   Continue to avoid any AV macy blockers  Blood pressure controlled on Norvasc  Therapeutic anticoagulation with Eliquis  Management of COPD per primary/pulmonology  Continue to monitor telemetry  Cardiology to sign off, kindly call with questions          Gissel Guajardo MD [facility] [place]

## 2024-10-02 NOTE — PROGRESS NOTES
Physical Therapy        Physical Therapy Cancel Note      DATE: 10/2/2024    NAME: Timothy Kern  MRN: [id number]   : 1950      Patient not seen this date for Physical Therapy due to:    Patient Declined: Patient declined PT intervention this date stating \"I just don't feel like it today.\" Will continue to follow for patient updates/needs.      Electronically signed by Laquita Monroy PTA on 10/2/2024 at 3:50 PM

## 2024-10-02 NOTE — PROGRESS NOTES
Spiritual Health History and Assessment/Progress Note  [facility]    (P) Palliative Care,  , Adjustment to illness,      Name: Timothy Kern MRN: [id number]    Age: 74 y.o.     Sex: male   Language: English   [facility]ist: Unknown   CAP (community acquired pneumonia) due to Chlamydia species     Date: 10/2/2024            Total Time Calculated: (P) 10 min              Spiritual Assessment continued in [facility]        Referral/Consult From: (P) Rounding   Encounter Overview/Reason: (P) Palliative Care  Service Provided For: (P) Patient    Risa, Belief, Meaning:   Patient has beliefs or practices that help with coping during difficult times  Family/Friends No family/friends present      Importance and Influence:  Patient has spiritual/personal beliefs that influence decisions regarding their health  Family/Friends No family/friends present    Community:  Patient Other: Pt. Commented that Pt.'s Family was not a close one  Family/Friends No family/friends present    Assessment and Plan of Care:     Patient Interventions include: Provided sacramental/Episcopal ritual  Family/Friends Interventions include: No family/friends present    Patient Plan of Care: Spiritual Care available upon further referral  Family/Friends Plan of Care: No family/friends present    Electronically signed by Chaplain Timbo on 10/2/2024 at 6:39 PM       10/02/24 1836   Encounter Summary   Encounter Overview/Reason Palliative Care   Service Provided For Patient   Referral/Consult From [facility]   Support System Children   Last Encounter  10/02/24   Complexity of Encounter Low   Begin Time 1825   End Time  1835   Total Time Calculated 10 min   Spiritual/Emotional needs   Type Spiritual Support   Assessment/Intervention/Outcome   Assessment Calm;Coping;Peaceful;Hopeful   Intervention Active listening;Sustaining Presence/Ministry of presence;Prayer (assurance of)/[place]   Outcome Engaged in  conversation;Connection/Belonging;Receptive   Plan and Referrals   Plan/Referrals Continue Support (comment)

## 2024-10-02 NOTE — CARE COORDINATION
[facility] requests a level of care form completed for this pt.    Writer placed call to Kira with public benefits to obtain pending Medicaid number for this pt. Must have pending Medicaid information to complete LOC. No answer, voicemail left for return call.   Electronically signed by GERARDO Ko on 10/2/2024 at 3:41 PM    Writer placed call to Lon with [facility] Office on Aging regarding LOC. Lon was able to locate this pt. Lon looked further, [facility] incorrectly submitted the PASRR information for this pt. Lon has submtited a ODM 3622 correction request to [facility].     Writer placed perfectserve message to bedside RN Jose Luis requesting TRISTAN to be signed and completed. Writer placed perfectserve message to Dr. Mas requesting TRISTAN to be completed. This is required for LOC form.    Writer placed call to Madeline at [facility] for update.  Electronically signed by GERARDO Ko on 10/2/2024 at 4:23 PM

## 2024-10-02 NOTE — PROGRESS NOTES
IN-PATIENT SERVICE   [facility] Medicine  [facility]  Dayo Mcadams MD; Zay Ramires MD; Jam Matthew MD; MD Johanne Sanders MD; Min Giordano MD; Ambreen Avina MD        Progress Note    10/2/2024    11:45 AM    Name:   Timothy Kern  MRN:     [id number]     Acct:      [id number]   Room:   2062/2062-01  IP Day:  8  Admit Date:  9/24/2024 12:33 PM    PCP:   No primary care provider on file.  Code Status:  DNR-CCA    Subjective:     C/C:   Chief Complaint   Patient presents with    Shortness of Breath         Interval History Status: Improving    HPI:     74-year-old male history of COPD on 3 L nasal cannula oxygen at home, A-fib on Eliquis, CHF transferred from SNF due to shortness of breath for 2 weeks improved with CPAP, ABG showed elevated CO2 with normal pH indicative of chronic CO2 retention.  Chest x-ray concerning for right-sided pneumonia, patient also complained of right upper quadrant pain    Review of Systems:     Positive for shortness of breath  Denies chest pain or palpitations  Denies abdominal pain, diarrhea vomiting  Denies any new numbness tremors or weakness.      Medications:     Allergies:    Allergies   Allergen Reactions    Codeine Rash       Current Meds:   Scheduled Meds:    amLODIPine  5 mg Oral Daily    predniSONE  30 mg Oral Daily    [START ON 10/5/2024] predniSONE  20 mg Oral Daily    [START ON 10/8/2024] predniSONE  10 mg Oral Daily    busPIRone  5 mg Oral TID    guaiFENesin  600 mg Oral BID    ipratropium 0.5 mg-albuterol 2.5 mg  1 Dose Inhalation Q4H WA RT    apixaban  5 mg Oral BID    atorvastatin  40 mg Oral QPM    sodium chloride flush  5-40 mL IntraVENous 2 times per day    pantoprazole  40 mg Oral QAM AC    melatonin  3 mg Oral Nightly     Continuous Infusions:    sodium chloride       PRN Meds: atropine, sodium chloride, diazePAM, ipratropium 0.5 mg-albuterol 2.5 mg, calcium carbonate, sodium chloride flush,  proBNP.  Reducing Lasix to 20 mg daily  Remains on Solu-Medrol 40 every 8hr- still significantly decreased air entry bilaterally on exam will continue with current dose.  Completed course of antibiotics.  Hemoglobin has remained stable last couple of days.  Will continue with Eliquis significant iron deficiency T sat 9%-will order 3 days of IV Venofer.  Stool Hemoccult pending  Continues to have right upper quadrant tenderness on exam, abdomen is distended which patient states is chronic.  Will check ultrasound liver.  Liver enzymes normal    9/30  Patient condition unchanged  Awaiting placement  Currently on steroid taper per pulmonology  Did keep BiPAP on overnight  Pulmonology recommending stopping diazepam patient has been on 5 mg twice daily-will reduce to 2 mg twice daily over the next few days and then stop  Patient unhappy at above change but agrees to continue .  BuSpar 5 mg 3 times daily has been added for anxiety  Lung exam shows bilaterally reduced air entry but no significant wheeze     10/1  Patient became bradycardic overnight-a.m. dose of Coreg was held  This morning heart rate more than 100 on exam hence low-dose Coreg resumed  Cardiology was consulted  Remains on IV steroid per pulmonology recommendation  Labs satisfactory WBC downtrending, hemoglobin stable  Subjectively doing well no new concerns  Air entry bilaterally reduced no wheeze  Did not need diazepam all day yesterday- reducing dose to once daily as needed from tomorrow    10/2  1 reading of asymptomatic bradycardia down to 45 again overnight  Coreg has been discontinued  No tachycardia so far  Has been switched to p.o. steroids  Stable on 3 L nasal cannula oxygen currently  Has not needed Valium last 3 days.  Will discontinue  Patient does report some anxiety today-discussed in detail-adding Zoloft.  Increase BuSpar to 15 mg 3 times daily.  Noted to have increased effort of breathing today scattered bilateral wheeze.  Will monitor for

## 2024-10-02 NOTE — PROGRESS NOTES
Occupational Therapy  [facility]   OCCUPATIONAL THERAPY MISSED TREATMENT NOTE   INPATIENT   Date: 10/2/24  Patient Name: Timothy Kern       Room:   MRN: [id number]   Account #: [id number]    : 1950  (74 y.o.)  Gender: male   Referring Practitioner: Franklin Herzog MD  Diagnosis: COPD exacerbation             REASON FOR MISSED TREATMENT:  Patient declined   -    pt sitting EOB upon arrival. After writer introduces self and states POC pt states \"not today. I don't feel like doing anything.\" Pt educated on benefits of working c therapy but pt still declines.         Electronically signed by ELIESER Beard on 10/2/24 at 3:24 PM EDT

## 2024-10-02 NOTE — CARE COORDINATION
Writer received call from Madeline Jena.   Pt will not need authorization to admit to facility, can admit under pending Medicaid.    Writer informed WILLARD Coates of this.   Electronically signed by GERARDO Ko on 10/2/2024 at 9:06 AM

## 2024-10-02 NOTE — PLAN OF CARE
without S/S of infection  10/2/2024 0309 by Lynette Fitzpatrick RN  Outcome: Progressing  Flowsheets (Taken 10/2/2024 0309)  Incisions, Wounds, or Drain Sites Healing Without Sign and Symptoms of Infection: TWICE DAILY: Assess and document skin integrity  10/1/2024 1730 by Mayank Arroyo RN  Outcome: Progressing     Problem: Gastrointestinal - Adult  Goal: Minimal or absence of nausea and vomiting  10/2/2024 0309 by Lynette Fitzpatrick RN  Outcome: Progressing  Flowsheets (Taken 10/2/2024 0309)  Minimal or absence of nausea and vomiting: Administer IV fluids as ordered to ensure adequate hydration  10/1/2024 1730 by Mayank Arroyo RN  Outcome: Progressing  Goal: Maintains or returns to baseline bowel function  10/2/2024 0309 by Lynette Fitzpatrick RN  Outcome: Progressing  Flowsheets (Taken 10/2/2024 0309)  Maintains or returns to baseline bowel function: Assess bowel function  10/1/2024 1730 by Mayank Arroyo RN  Outcome: Progressing     Problem: ABCDS Injury Assessment  Goal: Absence of physical injury  10/2/2024 0309 by Lynette Fitzpatrick RN  Outcome: Progressing  Flowsheets (Taken 10/2/2024 0309)  Absence of Physical Injury: Implement safety measures based on patient assessment  10/1/2024 1730 by Mayank Arroyo RN  Outcome: Progressing     Problem: Nutrition Deficit:  Goal: Optimize nutritional status  10/2/2024 0309 by Lynette Fitzpatrick RN  Outcome: Progressing  Flowsheets (Taken 10/2/2024 0309)  Nutrient intake appropriate for improving, restoring, or maintaining nutritional needs:   Assess nutritional status and recommend course of action   Monitor oral intake, labs, and treatment plans  10/1/2024 1730 by Mayank Arroyo RN  Outcome: Progressing

## 2024-10-02 NOTE — PROGRESS NOTES
[facility] PULMONARY,CRITICAL CARE & SLEEP   Benjiecindy Pardo MD/Giorgio HOLLINGSWORTH AGACNP-BC, NP-C      Abby HOLLINGSWORTH NP-C     Félix HOLLINGSWORTH NP-C                                          Pulmonary Progress Note    Patient - Timothy Kern   Age - 74 y.o.   - 1950  MRN - [id number]  Acct # - [id number]  Date of Admission - 2024 12:33 PM    Consulting Service/Physician:       Primary Care Physician: No primary care provider on file.    SUBJECTIVE:     Chief Complaint:   Chief Complaint   Patient presents with    Shortness of Breath     Subjective:    Timothy is seen sitting at the edge of the bed.  He does have exertional dyspnea.  He tells me his breathing feels about the same.  He tells me he did use the BiPAP overnight.  Bicarb today remains 44.  He has been afebrile.  He states plan is to go to rehab.  Case management note states he is accepted at [facility].    VITALS  /77   Pulse 96   Temp 97.5 °F (36.4 °C)   Resp 18   Ht 1.753 m (5' 9.02\")   Wt 72.1 kg (159 lb)   SpO2 98%   BMI 23.47 kg/m²   Wt Readings from Last 3 Encounters:   24 72.1 kg (159 lb)     I/O (24 Hours)    Intake/Output Summary (Last 24 hours) at 10/2/2024 1259  Last data filed at 10/2/2024 0807  Gross per 24 hour   Intake --   Output 350 ml   Net -350 ml     Ventilator:   Settings  FiO2 : 35 %  Insp Rise Time (%): 1 %  Exam:   Physical Exam   Constitutional: Elderly man sitting at edge of bed on 3 L in no acute distress  HENT: Unremarkable  Head: Normocephalic and atraumatic.   Eyes: EOM are normal. Pupils are equal, round, and reactive to light.   Neck: Neck supple.   Cardiovascular:  Regular rate and rhythm.  Normal heart tones.  No JVD.    Pulmonary/Chest: Mild exertional dyspnea, severely diminished throughout, faint expiratory wheeze, on 3 L with pulse ox 94%   Abdominal: Soft. Bowel sounds are normal.   Musculoskeletal: Normal range of motion.

## 2024-10-02 NOTE — CARE COORDINATION
Writer attempted to call pt son Miles. No answer, no option for voicemail.    Writer met with pt for update on acceptance to [facility]. All questions answered at this time.  Electronically signed by GERARDO Ko on 10/2/2024 at 9:55 AM

## 2024-10-03 PROBLEM — Z51.5 ENCOUNTER FOR PALLIATIVE CARE: Status: ACTIVE | Noted: 2024-10-03

## 2024-10-03 PROBLEM — J43.8 OTHER EMPHYSEMA (HCC): Status: ACTIVE | Noted: 2024-10-03

## 2024-10-03 PROBLEM — J43.9 PULMONARY EMPHYSEMA (HCC): Status: ACTIVE | Noted: 2024-10-03

## 2024-10-03 PROBLEM — Z71.89 ACP (ADVANCE CARE PLANNING): Status: ACTIVE | Noted: 2024-10-03

## 2024-10-03 LAB
ANION GAP SERPL CALCULATED.3IONS-SCNC: 3 MMOL/L (ref 9–17)
BASOPHILS # BLD: 0 K/UL (ref 0–0.2)
BASOPHILS NFR BLD: 0 % (ref 0–2)
BUN SERPL-MCNC: 20 MG/DL (ref 8–23)
CALCIUM SERPL-MCNC: 9.3 MG/DL (ref 8.6–10.4)
CHLORIDE SERPL-SCNC: 92 MMOL/L (ref 98–107)
CO2 SERPL-SCNC: 42 MMOL/L (ref 20–31)
CREAT SERPL-MCNC: 0.7 MG/DL (ref 0.7–1.2)
EOSINOPHIL # BLD: 0.16 K/UL (ref 0–0.4)
EOSINOPHILS RELATIVE PERCENT: 1 % (ref 0–4)
ERYTHROCYTE [DISTWIDTH] IN BLOOD BY AUTOMATED COUNT: 16.4 % (ref 11.5–14.9)
GFR, ESTIMATED: >90 ML/MIN/1.73M2
GLUCOSE SERPL-MCNC: 125 MG/DL (ref 70–99)
HCT VFR BLD AUTO: 28.3 % (ref 41–53)
HGB BLD-MCNC: 9.2 G/DL (ref 13.5–17.5)
LYMPHOCYTES NFR BLD: 1.59 K/UL (ref 1–4.8)
LYMPHOCYTES RELATIVE PERCENT: 10 % (ref 24–44)
MAGNESIUM SERPL-MCNC: 1.9 MG/DL (ref 1.6–2.6)
MCH RBC QN AUTO: 30.2 PG (ref 26–34)
MCHC RBC AUTO-ENTMCNC: 32.4 G/DL (ref 31–37)
MCV RBC AUTO: 93.1 FL (ref 80–100)
MONOCYTES NFR BLD: 1.11 K/UL (ref 0.1–1.3)
MONOCYTES NFR BLD: 7 % (ref 1–7)
MORPHOLOGY: ABNORMAL
MORPHOLOGY: ABNORMAL
NEUTROPHILS NFR BLD: 82 % (ref 36–66)
NEUTS SEG NFR BLD: 13.04 K/UL (ref 1.3–9.1)
PLATELET # BLD AUTO: 341 K/UL (ref 150–450)
PMV BLD AUTO: 8.4 FL (ref 6–12)
POTASSIUM SERPL-SCNC: 4.7 MMOL/L (ref 3.7–5.3)
RBC # BLD AUTO: 3.04 M/UL (ref 4.5–5.9)
SODIUM SERPL-SCNC: 137 MMOL/L (ref 135–144)
WBC OTHER # BLD: 15.9 K/UL (ref 3.5–11)

## 2024-10-03 PROCEDURE — 2060000000 HC ICU INTERMEDIATE R&B

## 2024-10-03 PROCEDURE — 6370000000 HC RX 637 (ALT 250 FOR IP): Performed by: NURSE PRACTITIONER

## 2024-10-03 PROCEDURE — 94640 AIRWAY INHALATION TREATMENT: CPT

## 2024-10-03 PROCEDURE — 2500000003 HC RX 250 WO HCPCS

## 2024-10-03 PROCEDURE — 97110 THERAPEUTIC EXERCISES: CPT

## 2024-10-03 PROCEDURE — 2700000000 HC OXYGEN THERAPY PER DAY

## 2024-10-03 PROCEDURE — 99232 SBSQ HOSP IP/OBS MODERATE 35: CPT | Performed by: INTERNAL MEDICINE

## 2024-10-03 PROCEDURE — 94761 N-INVAS EAR/PLS OXIMETRY MLT: CPT

## 2024-10-03 PROCEDURE — 85025 COMPLETE CBC W/AUTO DIFF WBC: CPT

## 2024-10-03 PROCEDURE — 6370000000 HC RX 637 (ALT 250 FOR IP): Performed by: INTERNAL MEDICINE

## 2024-10-03 PROCEDURE — 94660 CPAP INITIATION&MGMT: CPT

## 2024-10-03 PROCEDURE — 94669 MECHANICAL CHEST WALL OSCILL: CPT

## 2024-10-03 PROCEDURE — 99222 1ST HOSP IP/OBS MODERATE 55: CPT | Performed by: INTERNAL MEDICINE

## 2024-10-03 PROCEDURE — 36415 COLL VENOUS BLD VENIPUNCTURE: CPT

## 2024-10-03 PROCEDURE — 6370000000 HC RX 637 (ALT 250 FOR IP)

## 2024-10-03 PROCEDURE — 80048 BASIC METABOLIC PNL TOTAL CA: CPT

## 2024-10-03 PROCEDURE — 83735 ASSAY OF MAGNESIUM: CPT

## 2024-10-03 PROCEDURE — 97530 THERAPEUTIC ACTIVITIES: CPT

## 2024-10-03 PROCEDURE — 93005 ELECTROCARDIOGRAM TRACING: CPT

## 2024-10-03 PROCEDURE — 2580000003 HC RX 258

## 2024-10-03 RX ORDER — OXYCODONE HYDROCHLORIDE 5 MG/1
2.5 TABLET ORAL ONCE
Status: COMPLETED | OUTPATIENT
Start: 2024-10-03 | End: 2024-10-03

## 2024-10-03 RX ORDER — OXYCODONE HYDROCHLORIDE 5 MG/1
2.5 TABLET ORAL EVERY 6 HOURS PRN
Status: DISCONTINUED | OUTPATIENT
Start: 2024-10-03 | End: 2024-10-04 | Stop reason: HOSPADM

## 2024-10-03 RX ORDER — CETIRIZINE HYDROCHLORIDE 10 MG/1
10 TABLET ORAL DAILY
Status: DISCONTINUED | OUTPATIENT
Start: 2024-10-03 | End: 2024-10-04 | Stop reason: HOSPADM

## 2024-10-03 RX ORDER — FLUTICASONE PROPIONATE 50 MCG
1 SPRAY, SUSPENSION (ML) NASAL DAILY PRN
Status: DISCONTINUED | OUTPATIENT
Start: 2024-10-03 | End: 2024-10-04 | Stop reason: HOSPADM

## 2024-10-03 RX ORDER — AMLODIPINE BESYLATE 5 MG/1
5 TABLET ORAL ONCE
Status: COMPLETED | OUTPATIENT
Start: 2024-10-03 | End: 2024-10-03

## 2024-10-03 RX ORDER — ERYTHROMYCIN 5 MG/G
OINTMENT OPHTHALMIC EVERY 6 HOURS SCHEDULED
Status: DISCONTINUED | OUTPATIENT
Start: 2024-10-03 | End: 2024-10-04 | Stop reason: HOSPADM

## 2024-10-03 RX ORDER — AMLODIPINE BESYLATE 10 MG/1
10 TABLET ORAL DAILY
Status: DISCONTINUED | OUTPATIENT
Start: 2024-10-04 | End: 2024-10-04 | Stop reason: HOSPADM

## 2024-10-03 RX ADMIN — IPRATROPIUM BROMIDE AND ALBUTEROL SULFATE 1 DOSE: 2.5; .5 SOLUTION RESPIRATORY (INHALATION) at 15:16

## 2024-10-03 RX ADMIN — SERTRALINE 50 MG: 50 TABLET, FILM COATED ORAL at 08:50

## 2024-10-03 RX ADMIN — SODIUM CHLORIDE, PRESERVATIVE FREE 10 ML: 5 INJECTION INTRAVENOUS at 08:51

## 2024-10-03 RX ADMIN — BUSPIRONE HYDROCHLORIDE 15 MG: 15 TABLET ORAL at 08:50

## 2024-10-03 RX ADMIN — APIXABAN 5 MG: 5 TABLET, FILM COATED ORAL at 08:50

## 2024-10-03 RX ADMIN — IPRATROPIUM BROMIDE AND ALBUTEROL SULFATE 1 DOSE: 2.5; .5 SOLUTION RESPIRATORY (INHALATION) at 19:15

## 2024-10-03 RX ADMIN — OXYCODONE HYDROCHLORIDE 2.5 MG: 5 TABLET ORAL at 15:22

## 2024-10-03 RX ADMIN — APIXABAN 5 MG: 5 TABLET, FILM COATED ORAL at 19:38

## 2024-10-03 RX ADMIN — AMLODIPINE BESYLATE 5 MG: 5 TABLET ORAL at 08:50

## 2024-10-03 RX ADMIN — ERYTHROMYCIN: 5 OINTMENT OPHTHALMIC at 14:29

## 2024-10-03 RX ADMIN — IPRATROPIUM BROMIDE AND ALBUTEROL SULFATE 1 DOSE: 2.5; .5 SOLUTION RESPIRATORY (INHALATION) at 10:32

## 2024-10-03 RX ADMIN — AMLODIPINE BESYLATE 5 MG: 5 TABLET ORAL at 19:38

## 2024-10-03 RX ADMIN — PANTOPRAZOLE SODIUM 40 MG: 40 TABLET, DELAYED RELEASE ORAL at 06:23

## 2024-10-03 RX ADMIN — PREDNISONE 40 MG: 20 TABLET ORAL at 08:50

## 2024-10-03 RX ADMIN — GUAIFENESIN 600 MG: 600 TABLET, EXTENDED RELEASE ORAL at 08:50

## 2024-10-03 RX ADMIN — SODIUM CHLORIDE, PRESERVATIVE FREE 10 ML: 5 INJECTION INTRAVENOUS at 19:40

## 2024-10-03 RX ADMIN — BUSPIRONE HYDROCHLORIDE 15 MG: 15 TABLET ORAL at 12:19

## 2024-10-03 RX ADMIN — AMIODARONE HYDROCHLORIDE 1 MG/MIN: 1.8 INJECTION, SOLUTION INTRAVENOUS at 20:50

## 2024-10-03 RX ADMIN — BUSPIRONE HYDROCHLORIDE 15 MG: 15 TABLET ORAL at 19:38

## 2024-10-03 RX ADMIN — Medication 3 MG: at 19:38

## 2024-10-03 RX ADMIN — ERYTHROMYCIN: 5 OINTMENT OPHTHALMIC at 19:41

## 2024-10-03 RX ADMIN — GUAIFENESIN 600 MG: 600 TABLET, EXTENDED RELEASE ORAL at 19:38

## 2024-10-03 RX ADMIN — CETIRIZINE HYDROCHLORIDE 10 MG: 10 TABLET, FILM COATED ORAL at 09:19

## 2024-10-03 RX ADMIN — IPRATROPIUM BROMIDE AND ALBUTEROL SULFATE 1 DOSE: 2.5; .5 SOLUTION RESPIRATORY (INHALATION) at 06:45

## 2024-10-03 RX ADMIN — AMIODARONE HYDROCHLORIDE 150 MG: 1.5 INJECTION, SOLUTION INTRAVENOUS at 20:36

## 2024-10-03 RX ADMIN — ATORVASTATIN CALCIUM 40 MG: 40 TABLET, FILM COATED ORAL at 19:38

## 2024-10-03 ASSESSMENT — PAIN SCALES - GENERAL
PAINLEVEL_OUTOF10: 5
PAINLEVEL_OUTOF10: 0
PAINLEVEL_OUTOF10: 5

## 2024-10-03 ASSESSMENT — PAIN DESCRIPTION - ORIENTATION: ORIENTATION: RIGHT;MID

## 2024-10-03 ASSESSMENT — PAIN DESCRIPTION - LOCATION: LOCATION: EYE;HEAD

## 2024-10-03 ASSESSMENT — PAIN DESCRIPTION - DESCRIPTORS: DESCRIPTORS: ACHING

## 2024-10-03 NOTE — PROGRESS NOTES
Updated DARRELL Hernandez regarding cardiology orders to increase Norvasc for BP control and no new orders to control HR.

## 2024-10-03 NOTE — PROGRESS NOTES
Spiritual Health History and Assessment/Progress Note  [facility]    (P) Palliative Care,  , Adjustment to illness,      Name: Timothy Kern MRN: [id number]    Age: 74 y.o.     Sex: male   Language: English   Orthodox: Unknown   CAP (community acquired pneumonia) due to Chlamydia species     Date: 10/3/2024            Total Time Calculated: (P) 2 min              Spiritual Assessment continued in [facility]        Referral/Consult From: (P) Palliative Care   Encounter Overview/Reason: (P) Palliative Care  Service Provided For: (P) Patient    Pt. Receiving Patient Care    Risa, Belief, Meaning:   Patient unable to assess at this time  Family/Friends No family/friends present      Importance and Influence:  Patient unable to assess at this time  Family/Friends No family/friends present    Community:  Patient Other: Unable to assess as Pt. Receiving Patient Care  Family/Friends No family/friends present    Assessment and Plan of Care:     Patient Interventions include: Other: Dropped off Prayer Card  Family/Friends Interventions include: No family/friends present    Patient Plan of Care: Spiritual Care available upon further referral  Family/Friends Plan of Care: No family/friends present    Electronically signed by Chaplain Timbo on 10/3/2024 at 7:28 PM       10/03/24 1925   Encounter Summary   Encounter Overview/Reason Palliative Care   Service Provided For Patient   Referral/Consult From Palliative Care   Support System Children   Last Encounter  10/03/24   Complexity of Encounter Low   Begin Time 1923   End Time  1925   Total Time Calculated 2 min   Spiritual/Emotional needs   Type Spiritual Support   Assessment/Intervention/Outcome   Assessment Unable to assess;Other (Comment)  (Pt. receiving Patient Care)   Intervention Other (Comment)  (Dropped off Prayer Card)   Outcome Other (comment);Did not respond  (Pt. receiving Patient care)   Plan and Referrals   Plan/Referrals Continue

## 2024-10-03 NOTE — PROGRESS NOTES
Notified cardiology Dr. Guajardo regarding /98 and HR in the 160's-170's.  Coreg 6.25 mg was stopped on 10/2/24 due to HR in the 40's the night before.  New order received to increase Norvasc for BP control.  Verified if Dr. Guajardo wanted anything to control heart rate or wanted an EKG?  No new orders.

## 2024-10-03 NOTE — PROGRESS NOTES
Provider notified of patient elevated heart rate.  Provider noted beta-blocker due to overnight bradycardia.  Provider discussed with cardiology plan of care.  Amiodarone drip.  Transfer to [facility].  Patient asymptomatic.

## 2024-10-03 NOTE — PROGRESS NOTES
Extraocular movements are intact.  Lungs: Bilateral significantly reduced air entry, increased effort of breathing  Heart:  regular rate and rhythm, no murmur  Abdomen:  soft, appears distended, some right upper quadrant stiffness present.  Normal bowel sounds, no masses, hepatomegaly, splenomegaly  Extremities:  no edema, redness, tenderness in the calves  Skin:  no gross lesions, rashes, induration  Neuro:  Alert, oriented X 3, no new focal weakness  Assessment:        Primary Problem  CAP (community acquired pneumonia) due to Chlamydia species    Active Hospital Problems    Diagnosis Date Noted    Bradycardia [R00.1] 10/02/2024     Priority: High    Acute on chronic respiratory failure with hypoxemia [J96.21] 10/02/2024     Priority: High    Paroxysmal atrial fibrillation (HCC) [I48.0] 10/02/2024     Priority: High    CAP (community acquired pneumonia) due to Chlamydia species [J16.0] 09/24/2024       74-year-old male history of COPD on 3 L nasal cannula oxygen at home, A-fib on Eliquis, CHF transferred from SNF due to shortness of breath for 2 weeks improved with CPAP, ABG showed elevated CO2 with normal pH indicative of chronic CO2 retention.  Chest x-ray concerning for right-sided pneumonia, patient also complained of right upper quadrant pain    Plan:        Acute on chronic hypercapnic respiratory failure vs healthcare associated pneumonia  End-stage COPD with exacerbation treated with scheduled steroids bronchodilators  Presented with shortness of breath, requiring noninvasive ventilation (on BiPAP)  Hypoxic on presentation SpO2 83%  On chronic oxygen 3 L at nursing facility, baseline oxygen requirement  On DuoNeb  IV methylprednisolone 40 mg every 12 hours  Pneumonia workup is negative, MRSA positive  Pro-Star is negative  Lactic acid is normal range  ABGs on presentation showed chronic CO2 retention.  pCO2 of 94, bicarb 52.5  VBG's on 9/25/2024 showed pCO2 of 58, bicarb 46.4 and pH 7.512  ABGs on 9/30/2024  reviewed the key elements of all parts of the encounter with the resident.  I agree with the assessment, plan and orders as documented by the resident.    Principal Problem:    CAP (community acquired pneumonia) due to Chlamydia species  Active Problems:    Bradycardia    Acute on chronic respiratory failure with hypoxemia    Paroxysmal atrial fibrillation (HCC)  Resolved Problems:    * No resolved hospital problems. *      No change in patient condition clinically  Still has slight bilateral wheeze-on p.o. steroid taper per pulmonology  Right eye redness-acute conjunctivitis noted today ordering erythromycin ointment.  Awaiting placement      Electronically signed by Johanne Don MD

## 2024-10-03 NOTE — PROGRESS NOTES
Occupational Therapy  [facility]   INPATIENT OCCUPATIONAL THERAPY  PROGRESS NOTE  Date: 10/3/2024  Patient Name: Timothy Kern       Room:   MRN: [id number]    : 1950  (74 y.o.)  Gender: male   Referring Practitioner: Franklin Herzog MD  Diagnosis: COPD exacerbation      Discharge Recommendations:  Further Occupational Therapy is recommended upon facility discharge.    OT Equipment Recommendations  Other: TBD    Restrictions/Precautions  Restrictions/Precautions  Restrictions/Precautions: General Precautions;Contact Precautions;Fall Risk (MRSA)  Required Braces or Orthoses?: No  Position Activity Restriction  Other position/activity restrictions: up with assist, impulsive, monitor SP02 PRN- on 3L    O2 Device: Nasal cannula (3L)  Comment: patient flucatuating between 83-98% with education on purse lip breathing throughout. Quick recovery after breathing techniques    Subjective  Subjective  Subjective: \"That felt good.\" pt referring to HEP. pt sitting EOB as writer enters/exits room. pt pleasant and agreeable to OT session.  Subjective  Pain: denies pain       Objective  Orientation  Overall Orientation Status: Within Functional Limits  Orientation Level: Oriented X4  Cognition  Overall Cognitive Status: Exceptions  Arousal/Alertness: Appears intact  Following Commands: Follows one step commands with increased time;Follows one step commands with repetition  Attention Span: Attends with cues to redirect  Memory: Impaired  Safety Judgement: Decreased awareness of need for assistance;Decreased awareness of need for safety  Problem Solving: Decreased awareness of errors;Assistance required to correct errors made;Assistance required to identify errors made;Assistance required to implement solutions;Assistance required to generate solutions  Insights: Decreased awareness of deficits  Initiation: Requires cues for some  Sequencing: Requires cues for some    Activities of Daily

## 2024-10-03 NOTE — CARE COORDINATION
Writer met with pt for update on placement and level of care form process.  All questions answered at this time.  Electronically signed by GERARDO Ko on 10/3/2024 at 3:02 PM

## 2024-10-03 NOTE — PLAN OF CARE
Problem: Discharge Planning  Goal: Discharge to home or other facility with appropriate resources  10/3/2024 1217 by Lurdes Richards RN  Outcome: Progressing  Flowsheets (Taken 10/2/2024 0309 by Lynette Fitzpatrick, RN)  Discharge to home or other facility with appropriate resources:   Identify barriers to discharge with patient and caregiver   Arrange for needed discharge resources and transportation as appropriate   Identify discharge learning needs (meds, wound care, etc)   Arrange for interpreters to assist at discharge as needed   Refer to discharge planning if patient needs post-hospital services based on physician order or complex needs related to functional status, cognitive ability or social support system  10/3/2024 0447 by Brandon Coombs RN  Outcome: Progressing     Problem: Pain  Goal: Verbalizes/displays adequate comfort level or baseline comfort level  10/3/2024 1217 by Lurdes Richards RN  Outcome: Progressing  Flowsheets (Taken 10/2/2024 0309 by Lynette Fitzpatrick, RN)  Verbalizes/displays adequate comfort level or baseline comfort level:   Encourage patient to monitor pain and request assistance   Assess pain using appropriate pain scale   Administer analgesics based on type and severity of pain and evaluate response   Implement non-pharmacological measures as appropriate and evaluate response   Consider cultural and social influences on pain and pain management   Notify Licensed Independent Practitioner if interventions unsuccessful or patient reports new pain  10/3/2024 0447 by Brandon Coombs RN  Outcome: Progressing     Problem: Safety - Adult  Goal: Free from fall injury  10/3/2024 1217 by Lurdes Richards RN  Outcome: Progressing  Flowsheets (Taken 10/2/2024 0309 by Lynette Fitzpatrick, RN)  Free From Fall Injury: Instruct family/caregiver on patient safety  10/3/2024 0447 by Brandon Coombs RN  Outcome: Progressing     Problem: Skin/Tissue Integrity  Goal: Absence of new  vomiting  Outcome: Progressing  Flowsheets (Taken 10/2/2024 0309 by Lynette Fitzpatrick, RN)  Minimal or absence of nausea and vomiting: Administer IV fluids as ordered to ensure adequate hydration  Goal: Maintains or returns to baseline bowel function  Outcome: Progressing  Flowsheets (Taken 10/2/2024 0309 by Lynette Fitzpatrick, RN)  Maintains or returns to baseline bowel function: Assess bowel function     Problem: ABCDS Injury Assessment  Goal: Absence of physical injury  Outcome: Progressing  Flowsheets (Taken 10/2/2024 0309 by Lynette Fitzpatrick, RN)  Absence of Physical Injury: Implement safety measures based on patient assessment     Problem: Nutrition Deficit:  Goal: Optimize nutritional status  Outcome: Progressing  Flowsheets (Taken 10/2/2024 0309 by Lynette Fitzpatrick, RN)  Nutrient intake appropriate for improving, restoring, or maintaining nutritional needs:   Assess nutritional status and recommend course of action   Monitor oral intake, labs, and treatment plans

## 2024-10-03 NOTE — CONSULTS
Palliative Care Inpatient Consult    NAME:  Timothy Kern  MEDICAL RECORD NUMBER:  [id number]  AGE: 74 y.o.   GENDER: male  : 1950  TODAY'S DATE:  10/3/2024    Reasons for Consultation:    Symptom and/or pain management  Provision of information regarding PC and/or hospice philosophies  Complex, time-intensive communication and interdisciplinary psychosocial support  Clarification of goals of care and/or assistance with difficult decision-making  Guidance in regards to resources and transition(s)    Members of PC team contributing to this consultation are: Bryn Roberts, DO    Plan      Palliative Interaction:  Timothy was seen today on palliative care rounds.  After introductions, I explained my role to him.  He is resting in bed, on 3 L of supplemental oxygen.  Appears somewhat dyspneic at rest, more so with conversation.  We reviewed his chronic medical illnesses.  He tells me that he has heart issues and COPD.  He has been wearing supplemental oxygen for the last 1 year 9 months.  His endurance is quite poor.    He moved to the area in March of this year from [place].  He was at rehab facilities prior to that.  In fact, he was evaluated for hospice care down in [place].  He tells me he was quite frustrated with the agency he chose, as they had promised him to continue seeing his doctors while on hospice, which is a benefit of hospice.  However, this was not so.  He ultimately moved back to the area to be closer to family.    He has multiple children.  He wants to name his son, Melvin, is his healthcare power of .  He is local and notes that he would be the one that is on other documents.  We will complete this paperwork tomorrow.    We reviewed what his goals and wishes are.  He does tell me that he understands the significance of his illness.  He is not quite ready for hospice care yet.  Our goal should be to get him set up with an outpatient palliative care service that can monitor his breathing

## 2024-10-03 NOTE — PROGRESS NOTES
[facility] PULMONARY,CRITICAL CARE & SLEEP   Benjiecindy Pardo MD/Giorgio HOLLINGSWORTH AGACNP-BC, NP-C      Abby HOLLINGSWORTH NP-C     Félix HOLLINGSWORTH NP-C                                          Pulmonary Progress Note    Patient - Timothy Kern   Age - 74 y.o.   - 1950  MRN - [id number]  Acct # - [id number]  Date of Admission - 2024 12:33 PM    Consulting Service/Physician:       Primary Care Physician: No primary care provider on file.    SUBJECTIVE:     Chief Complaint:   Chief Complaint   Patient presents with    Shortness of Breath     Subjective:    Timothy is seen sitting up in bed on baseline 3 L.  He is complaining of a lot of sinus congestion.  He states his breathing is the same.  He is on oral prednisone.  He is using the BiPAP for several hours overnight.  He is pending placement at [facility].  He has been afebrile.    VITALS  /80   Pulse 58   Temp 97.4 °F (36.3 °C)   Resp 16   Ht 1.753 m (5' 9.02\")   Wt 72.1 kg (159 lb)   SpO2 99%   BMI 23.47 kg/m²   Wt Readings from Last 3 Encounters:   24 72.1 kg (159 lb)     I/O (24 Hours)    Intake/Output Summary (Last 24 hours) at 10/3/2024 1126  Last data filed at 10/3/2024 0852  Gross per 24 hour   Intake 240 ml   Output 250 ml   Net -10 ml     Ventilator:   Settings  FiO2 : 35 %  Insp Rise Time (%): 1 %  Exam:   Physical Exam   Constitutional: Elderly man sitting up in bed on 3 L in no acute distress  HENT: Unremarkable  Head: Normocephalic and atraumatic.   Eyes: EOM are normal. Pupils are equal, round, and reactive to light.   Neck: Neck supple.   Cardiovascular:  Regular rate and rhythm.  Normal heart tones.  No JVD.    Pulmonary/Chest: Mild exertional dyspnea, severely diminished throughout, on 3 L with pulse ox 99%   Abdominal: Soft. Bowel sounds are normal.   Musculoskeletal: Normal range of motion.   Neurological: Patient is alert and oriented to person, place, and time.  forced vital capacity 39%, FEV1 22% / 0.69 L, residual volume 224%  Tobacco dependence in remission quit smoking January 2024  Pulmonary cachexia  Multiple bilateral lung scars and nodular opacities, malignancy felt to be less likely but not totally excluded  Subcarinal mediastinal adenopathy  Ascending aortic aneurysm 4.4 cm stable since last year  Chronic debility  History of atrial fibrillation per medical record, on Eliquis  MRSA colonizer positive nasal swab but no signs of active infection  DNR CC arrest/DO NOT INTUBATE  PLAN:   Continue BiPAP at night and with naps.  May use as needed during the day.  Discussed at bedside importance of BiPAP use.  He is to continue on BiPAP at skilled nursing facility at night and with naps at current settings of 18/8, FiO2 35%, backup rate 14.  Without the use of BiPAP therapy given his severe COPD, chronic hypercapnia and possible MARGA he will likely have frequent readmissions or adverse outcome  Wean oxygen, keep pulse ox above 89%  Continue bronchodilators, should be on triple drug therapy at discharge  Prednisone taper as written  Will need follow-up CT of the chest in 6 to 8 weeks  Would benefit from outpatient PFT/pulmonary follow-up after discharge, [phone], call office for appointment  Encourage flutter valve and I-S use  Discussed with nursing  No objection to skilled nursing facility from pulmonary standpoint  We will add on Flonase, agree with Zyrtec      Electronically signed by EDEL Merrill CNP on 10/03/24     This progress note was completed using a voice transcription system. Every effort was made to ensure accuracy. However, inadvertent computerized transcription errors may be present.    Abby Donald, NP-C, MSN  [facility] Pulmonary, Critical Care & Sleep

## 2024-10-03 NOTE — CARE COORDINATION
Writer is following for potential discharge to [facility]. Facility requires a level of care to admit to facility. Due to prior facility completing HENS with incorrect social security number, LOC cannot be completed. Writer received e-mail from Yury with [facility] Office on Aging to confirm a ticket has been started for this change in HENS.     Electronically signed by GERARDO Ko on 10/3/2024 at 8:30 AM    PASRR has been corrected. Writer placed call to charge WILLARD Good to request RN portion of TRISTAN to be completed for LOC to be faxed.  Electronically signed by GERARDO Ko on 10/3/2024 at 2:13 PM

## 2024-10-04 VITALS
OXYGEN SATURATION: 94 % | TEMPERATURE: 97.4 F | SYSTOLIC BLOOD PRESSURE: 145 MMHG | HEIGHT: 69 IN | DIASTOLIC BLOOD PRESSURE: 87 MMHG | WEIGHT: 173.5 LBS | RESPIRATION RATE: 22 BRPM | BODY MASS INDEX: 25.7 KG/M2 | HEART RATE: 105 BPM

## 2024-10-04 PROBLEM — J44.1 COPD EXACERBATION (HCC): Status: ACTIVE | Noted: 2024-10-04

## 2024-10-04 LAB
ABSOLUTE BANDS: 0.16 K/UL (ref 0–1)
ANION GAP SERPL CALCULATED.3IONS-SCNC: 9 MMOL/L (ref 9–17)
BANDS: 1 % (ref 0–10)
BASOPHILS # BLD: 0 K/UL (ref 0–0.2)
BASOPHILS NFR BLD: 0 % (ref 0–2)
BUN SERPL-MCNC: 22 MG/DL (ref 8–23)
CALCIUM SERPL-MCNC: 9 MG/DL (ref 8.6–10.4)
CHLORIDE SERPL-SCNC: 94 MMOL/L (ref 98–107)
CO2 SERPL-SCNC: 37 MMOL/L (ref 20–31)
CREAT SERPL-MCNC: 0.7 MG/DL (ref 0.7–1.2)
EOSINOPHIL # BLD: 0.16 K/UL (ref 0–0.4)
EOSINOPHILS RELATIVE PERCENT: 1 % (ref 0–4)
ERYTHROCYTE [DISTWIDTH] IN BLOOD BY AUTOMATED COUNT: 16.5 % (ref 11.5–14.9)
GFR, ESTIMATED: >90 ML/MIN/1.73M2
GLUCOSE SERPL-MCNC: 114 MG/DL (ref 70–99)
HCT VFR BLD AUTO: 29.1 % (ref 41–53)
HGB BLD-MCNC: 9.4 G/DL (ref 13.5–17.5)
LYMPHOCYTES NFR BLD: 1.78 K/UL (ref 1–4.8)
LYMPHOCYTES RELATIVE PERCENT: 11 % (ref 24–44)
MCH RBC QN AUTO: 29.7 PG (ref 26–34)
MCHC RBC AUTO-ENTMCNC: 32.3 G/DL (ref 31–37)
MCV RBC AUTO: 92.1 FL (ref 80–100)
METAMYELOCYTES ABSOLUTE COUNT: 0.16 K/UL
METAMYELOCYTES: 1 %
MONOCYTES NFR BLD: 1.61 K/UL (ref 0.1–1.3)
MONOCYTES NFR BLD: 10 % (ref 1–7)
MORPHOLOGY: ABNORMAL
MYELOCYTES ABSOLUTE COUNT: 0.16 K/UL
MYELOCYTES: 1 %
NEUTROPHILS NFR BLD: 75 % (ref 36–66)
NEUTS SEG NFR BLD: 12.11 K/UL (ref 1.3–9.1)
NUCLEATED RED BLOOD CELLS: 1 PER 100 WBC
PLATELET # BLD AUTO: 323 K/UL (ref 150–450)
PMV BLD AUTO: 8.7 FL (ref 6–12)
POTASSIUM SERPL-SCNC: 4.3 MMOL/L (ref 3.7–5.3)
RBC # BLD AUTO: 3.16 M/UL (ref 4.5–5.9)
SODIUM SERPL-SCNC: 140 MMOL/L (ref 135–144)
TSH SERPL DL<=0.05 MIU/L-ACNC: 0.32 UIU/ML (ref 0.3–5)
WBC OTHER # BLD: 16.1 K/UL (ref 3.5–11)

## 2024-10-04 PROCEDURE — 84443 ASSAY THYROID STIM HORMONE: CPT

## 2024-10-04 PROCEDURE — 94660 CPAP INITIATION&MGMT: CPT

## 2024-10-04 PROCEDURE — 6370000000 HC RX 637 (ALT 250 FOR IP): Performed by: INTERNAL MEDICINE

## 2024-10-04 PROCEDURE — 80048 BASIC METABOLIC PNL TOTAL CA: CPT

## 2024-10-04 PROCEDURE — 85025 COMPLETE CBC W/AUTO DIFF WBC: CPT

## 2024-10-04 PROCEDURE — 94761 N-INVAS EAR/PLS OXIMETRY MLT: CPT

## 2024-10-04 PROCEDURE — 6370000000 HC RX 637 (ALT 250 FOR IP)

## 2024-10-04 PROCEDURE — 6370000000 HC RX 637 (ALT 250 FOR IP): Performed by: NURSE PRACTITIONER

## 2024-10-04 PROCEDURE — 99239 HOSP IP/OBS DSCHRG MGMT >30: CPT | Performed by: INTERNAL MEDICINE

## 2024-10-04 PROCEDURE — 94640 AIRWAY INHALATION TREATMENT: CPT

## 2024-10-04 PROCEDURE — 2580000003 HC RX 258

## 2024-10-04 PROCEDURE — 2500000003 HC RX 250 WO HCPCS

## 2024-10-04 PROCEDURE — 36415 COLL VENOUS BLD VENIPUNCTURE: CPT

## 2024-10-04 PROCEDURE — 94669 MECHANICAL CHEST WALL OSCILL: CPT

## 2024-10-04 PROCEDURE — 93005 ELECTROCARDIOGRAM TRACING: CPT

## 2024-10-04 PROCEDURE — 2700000000 HC OXYGEN THERAPY PER DAY

## 2024-10-04 RX ORDER — FLUTICASONE PROPIONATE 50 MCG
1 SPRAY, SUSPENSION (ML) NASAL DAILY PRN
Qty: 16 G | Refills: 3 | Status: SHIPPED | OUTPATIENT
Start: 2024-10-04

## 2024-10-04 RX ORDER — GUAIFENESIN 600 MG/1
600 TABLET, EXTENDED RELEASE ORAL 2 TIMES DAILY
Qty: 30 TABLET | Refills: 5 | Status: SHIPPED | OUTPATIENT
Start: 2024-10-04

## 2024-10-04 RX ORDER — AMIODARONE HYDROCHLORIDE 200 MG/1
200 TABLET ORAL 2 TIMES DAILY
Status: DISCONTINUED | OUTPATIENT
Start: 2024-10-04 | End: 2024-10-04 | Stop reason: HOSPADM

## 2024-10-04 RX ORDER — BUSPIRONE HYDROCHLORIDE 15 MG/1
15 TABLET ORAL 3 TIMES DAILY
Qty: 30 TABLET | Refills: 5 | Status: SHIPPED | OUTPATIENT
Start: 2024-10-04

## 2024-10-04 RX ORDER — AMLODIPINE BESYLATE 10 MG/1
10 TABLET ORAL DAILY
Qty: 30 TABLET | Refills: 3 | Status: SHIPPED | OUTPATIENT
Start: 2024-10-05

## 2024-10-04 RX ORDER — AMIODARONE HYDROCHLORIDE 200 MG/1
TABLET ORAL
Qty: 21 TABLET | Refills: 0 | Status: SHIPPED | OUTPATIENT
Start: 2024-10-04 | End: 2024-10-18

## 2024-10-04 RX ORDER — ERYTHROMYCIN 5 MG/G
OINTMENT OPHTHALMIC NIGHTLY
Qty: 3.5 G | Refills: 0 | Status: SHIPPED | OUTPATIENT
Start: 2024-10-04

## 2024-10-04 RX ORDER — PREDNISONE 20 MG/1
20 TABLET ORAL DAILY
Qty: 3 TABLET | Refills: 0 | Status: SHIPPED | OUTPATIENT
Start: 2024-10-08 | End: 2024-10-11

## 2024-10-04 RX ORDER — AMIODARONE HYDROCHLORIDE 200 MG/1
200 TABLET ORAL DAILY
Status: DISCONTINUED | OUTPATIENT
Start: 2024-10-11 | End: 2024-10-04 | Stop reason: HOSPADM

## 2024-10-04 RX ORDER — PREDNISONE 10 MG/1
10 TABLET ORAL DAILY
Qty: 3 TABLET | Refills: 0 | Status: SHIPPED | OUTPATIENT
Start: 2024-10-11 | End: 2024-10-14

## 2024-10-04 RX ORDER — CETIRIZINE HYDROCHLORIDE 10 MG/1
10 TABLET ORAL DAILY
Qty: 30 TABLET | Refills: 0 | Status: SHIPPED | OUTPATIENT
Start: 2024-10-05

## 2024-10-04 RX ORDER — IPRATROPIUM BROMIDE AND ALBUTEROL SULFATE 2.5; .5 MG/3ML; MG/3ML
3 SOLUTION RESPIRATORY (INHALATION)
Qty: 360 ML | Refills: 5 | Status: SHIPPED | OUTPATIENT
Start: 2024-10-04

## 2024-10-04 RX ORDER — PREDNISONE 10 MG/1
30 TABLET ORAL DAILY
Qty: 9 TABLET | Refills: 0 | Status: SHIPPED | OUTPATIENT
Start: 2024-10-05 | End: 2024-10-08

## 2024-10-04 RX ORDER — ERYTHROMYCIN 5 MG/G
OINTMENT OPHTHALMIC
Qty: 1 EACH | Refills: 0 | Status: CANCELLED | OUTPATIENT
Start: 2024-10-04 | End: 2024-10-13

## 2024-10-04 RX ADMIN — AMIODARONE HYDROCHLORIDE 0.5 MG/MIN: 1.8 INJECTION, SOLUTION INTRAVENOUS at 01:45

## 2024-10-04 RX ADMIN — AMIODARONE HYDROCHLORIDE 200 MG: 200 TABLET ORAL at 12:32

## 2024-10-04 RX ADMIN — BUSPIRONE HYDROCHLORIDE 15 MG: 15 TABLET ORAL at 09:51

## 2024-10-04 RX ADMIN — PANTOPRAZOLE SODIUM 40 MG: 40 TABLET, DELAYED RELEASE ORAL at 06:07

## 2024-10-04 RX ADMIN — IPRATROPIUM BROMIDE AND ALBUTEROL SULFATE 1 DOSE: 2.5; .5 SOLUTION RESPIRATORY (INHALATION) at 15:05

## 2024-10-04 RX ADMIN — CETIRIZINE HYDROCHLORIDE 10 MG: 10 TABLET, FILM COATED ORAL at 09:51

## 2024-10-04 RX ADMIN — APIXABAN 5 MG: 5 TABLET, FILM COATED ORAL at 09:51

## 2024-10-04 RX ADMIN — SERTRALINE 50 MG: 50 TABLET, FILM COATED ORAL at 09:51

## 2024-10-04 RX ADMIN — BUSPIRONE HYDROCHLORIDE 15 MG: 15 TABLET ORAL at 16:11

## 2024-10-04 RX ADMIN — IPRATROPIUM BROMIDE AND ALBUTEROL SULFATE 1 DOSE: 2.5; .5 SOLUTION RESPIRATORY (INHALATION) at 11:32

## 2024-10-04 RX ADMIN — AMLODIPINE BESYLATE 10 MG: 10 TABLET ORAL at 09:51

## 2024-10-04 RX ADMIN — SODIUM CHLORIDE, PRESERVATIVE FREE 10 ML: 5 INJECTION INTRAVENOUS at 09:52

## 2024-10-04 RX ADMIN — PREDNISONE 40 MG: 20 TABLET ORAL at 09:51

## 2024-10-04 RX ADMIN — IPRATROPIUM BROMIDE AND ALBUTEROL SULFATE 1 DOSE: 2.5; .5 SOLUTION RESPIRATORY (INHALATION) at 07:11

## 2024-10-04 RX ADMIN — ERYTHROMYCIN: 5 OINTMENT OPHTHALMIC at 06:07

## 2024-10-04 RX ADMIN — GUAIFENESIN 600 MG: 600 TABLET, EXTENDED RELEASE ORAL at 09:51

## 2024-10-04 NOTE — CARE COORDINATION
Writer is following for potential discharge to [facility].  Level of care has been approved, pt can admit to facility when medically ready per Madeline.  Writer met with pt for update on approval through LOC, all questions answered at this time.  Electronically signed by GERARDO Ko on 10/4/2024 at 10:51 AM    Writer met with bedside RN Nazia for update on discharge, writer requests Nazia to inform if pt is able to discharge.  Electronically signed by GERARDO Ko on 10/4/2024 at 1:40 PM

## 2024-10-04 NOTE — PROGRESS NOTES
Writer explained to pt of irregular heart rhythm and the need to transfer pt to PCU. All questions answered.

## 2024-10-04 NOTE — PROGRESS NOTES
Physical Therapy Cancel Note      DATE: 10/4/2024    NAME: Timothy Kern  MRN: [id number]   : 1950      Patient not seen this date for Physical Therapy due to:    Pt preparing to take a shower. Will check back as time allows.      Electronically signed by ALPA Sanchez on 10/4/2024 at 2:57 PM    The above documentation completed by Student Physical Therapist Assistant was provided under the direct line of sight by supervision. Documentation was reviewed and accepted by supervising Certified Clinical Instructor, Apolonia Shanks PTA.

## 2024-10-04 NOTE — PROGRESS NOTES
Cardiology Progress Note                     Date:   10/4/2024  Patient name: Timothy Kern  Date of admission:  9/24/2024 12:33 PM  MRN:   [id number]  YOB: 1950  PCP: No primary care provider on file.    Reason for Admission:  acute on chronic hypoxic resp failure     Subjective:     Overnight patient went into atrial fibrillation with rapid ventricular response, blood pressure was elevated, started on IV amiodarone with spontaneous conversion to normal sinus rhythm this morning, currently off infusion, heart rate 60s at rest, denies any chest pain or dizziness, reports somewhat improvement in dyspnea.        Scheduled Meds:   cetirizine  10 mg Oral Daily    erythromycin   Right Eye 4 times per day    amLODIPine  10 mg Oral Daily    sertraline  50 mg Oral Daily    busPIRone  15 mg Oral TID    [START ON 10/5/2024] predniSONE  30 mg Oral Daily    [START ON 10/8/2024] predniSONE  20 mg Oral Daily    [START ON 10/11/2024] predniSONE  10 mg Oral Daily    guaiFENesin  600 mg Oral BID    ipratropium 0.5 mg-albuterol 2.5 mg  1 Dose Inhalation Q4H WA RT    apixaban  5 mg Oral BID    atorvastatin  40 mg Oral QPM    sodium chloride flush  5-40 mL IntraVENous 2 times per day    pantoprazole  40 mg Oral QAM AC    melatonin  3 mg Oral Nightly       Continuous Infusions:   amiodarone Stopped (10/04/24 0520)    sodium chloride         Labs:     CBC:   Recent Labs     10/02/24  0547 10/03/24  0735 10/04/24  0932   WBC 13.8* 15.9* 16.1*   HGB 8.5* 9.2* 9.4*    341 323     BMP:    Recent Labs     10/02/24  0547 10/03/24  0735 10/04/24  0932    137 140   K 4.1 4.7 4.3   CL 95* 92* 94*   CO2 44* 42* 37*   BUN 23 20 22   CREATININE 0.5* 0.7 0.7   GLUCOSE 116* 125* 114*     Hepatic: No results for input(s): \"AST\", \"ALT\", \"BILITOT\", \"ALKPHOS\" in the last 72 hours.    Invalid input(s): \"ALB\"  Troponin: No results for input(s): \"TROPONINI\" in the last 72 hours.  BNP: No results for input(s): \"BNP\"  in the last 72 hours.  Lipids: No results for input(s): \"CHOL\", \"HDL\" in the last 72 hours.    Invalid input(s): \"LDLCALCU\"  INR: No results for input(s): \"INR\" in the last 72 hours.      Objective:     Vitals: /78   Pulse 67   Temp 97.1 °F (36.2 °C) (Oral)   Resp 24   Ht 1.753 m (5' 9.02\")   Wt 78.7 kg (173 lb 8 oz)   SpO2 98%   BMI 25.61 kg/m²     General appearance: awake, alert, in no apparent respiratory distress on 3 L nasal cannula oxygen  HEENT: Head: Normocephalic, no lesions, without obvious abnormality  Neck: no JVD  Lungs: Diminished air entry bilaterally with scattered rhonchi  Heart: regular rate and rhythm, S1, S2 normal, no murmur, click, rub or gallop  Abdomen: soft, non-tender; bowel sounds normal  Extremities: No LE edema  Neurologic: Mental status: Alert, oriented. Motor and sensory not done.       Cardiac testing:     EKG:  Normal sinus rhythm, NORMAL ECG ON 9/24/24        TTE 9/30/2024    Left Ventricle: Normal left ventricular systolic function with a visually estimated EF of 55 - 60%. Left ventricle size is normal. Mildly increased wall thickness. Unable to assess wall motion. Normal diastolic function.    Image quality is adequate.    Impression:   Paroxysmal atrial fibrillation with RVR, now in sinus rhythm  Sinus bradycardia on admission  Acute on chronic hypoxemic respiratory failure  COPD with exacerbation  Chronic active smoker  Reported history of paroxysmal atrial fibrillation on DOAC  Heart failure with preserved ejection fraction  Pulmonary cachexia    Patient Active Problem List:     CAP (community acquired pneumonia) due to Chlamydia species        Plan:   Start p.o. amiodarone 200 mg twice daily for 7 days followed by 200 mg daily thereafter.    Blood pressure now controlled on Norvasc  Continue therapeutic anticoagulation with Eliquis  Management of COPD per primary/pulmonology  No objection to discharge home from cardiology perspective with outpatient follow-up in

## 2024-10-04 NOTE — PROGRESS NOTES
Re-consulting Dr. FREDERIC Guajardo due to Afib RVR last night and being placed on Amiodarone drip. Drip was discontinued this morning. Dr. Guajardo updated.

## 2024-10-04 NOTE — PROGRESS NOTES
Pulmonary Progress Note  [facility]      Patient - Timothy Kern,  Age - 74 y.o.    - 1950      Room Number - 2111/2111-01   N -  [id number]   [facility] # - [id number]  Date of Admission -  2024 12:33 PM        Consulting Service/Physician   Consulting - Ambreen Avina MD  Primary Care Physician - No primary care provider on file.     SUBJECTIVE   Patient appears to be doing okay all things considered.  I was able to round with patient's nurse, Danika LAMAR    Patient is on his 3 L nasal cannula.  He has been encouraged to use the BiPAP at the 3 L.    OBJECTIVE   VITALS    height is 1.753 m (5' 9.02\") and weight is 78.7 kg (173 lb 8 oz). His oral temperature is 97.4 °F (36.3 °C). His blood pressure is 145/87 (abnormal) and his pulse is 105 (abnormal). His respiration is 22 and oxygen saturation is 94%.     Body mass index is 25.61 kg/m².  Temperature Range: Temp: 97.4 °F (36.3 °C) Temp  Av.5 °F (36.4 °C)  Min: 97.1 °F (36.2 °C)  Max: 98.1 °F (36.7 °C)  BP Range:  Systolic (24hrs), Av , Min:105 , Max:172     Diastolic (24hrs), Av, Min:69, Max:107    Pulse Range: Pulse  Av.2  Min: 57  Max: 139  Respiration Range: Resp  Av.2  Min: 16  Max: 24  Current Pulse Ox::  SpO2: 94 %  24HR Pulse Ox Range:  SpO2  Av.6 %  Min: 94 %  Max: 100 %  Oxygen Amount and Delivery: O2 Flow Rate (L/min): 3 L/min    Wt Readings from Last 3 Encounters:   10/04/24 78.7 kg (173 lb 8 oz)       I/O (24 Hours)    Intake/Output Summary (Last 24 hours) at 10/4/2024 1617  Last data filed at 10/4/2024 1200  Gross per 24 hour   Intake 780 ml   Output 300 ml   Net 480 ml       EXAM     General Appearance  Awake, alert, oriented, in no acute distress  HEENT - normocephalic, atraumatic.  Neck - Supple,  trachea midline   Lungs -coarse breath sounds no crackles rales or wheezes  Heart Exam:PMI normal. No lifts, heaves, or thrills. RRR. No murmurs, clicks, gallops, or rubs  Abdomen Exam:  Abdomen soft,   Extremity Exam: No signs of cyanosis    MEDS      amiodarone  200 mg Oral BID    Followed by    [START ON 10/11/2024] amiodarone  200 mg Oral Daily    cetirizine  10 mg Oral Daily    erythromycin   Right Eye 4 times per day    amLODIPine  10 mg Oral Daily    sertraline  50 mg Oral Daily    busPIRone  15 mg Oral TID    [START ON 10/5/2024] predniSONE  30 mg Oral Daily    [START ON 10/8/2024] predniSONE  20 mg Oral Daily    [START ON 10/11/2024] predniSONE  10 mg Oral Daily    guaiFENesin  600 mg Oral BID    ipratropium 0.5 mg-albuterol 2.5 mg  1 Dose Inhalation Q4H WA RT    apixaban  5 mg Oral BID    atorvastatin  40 mg Oral QPM    sodium chloride flush  5-40 mL IntraVENous 2 times per day    pantoprazole  40 mg Oral QAM AC    melatonin  3 mg Oral Nightly      sodium chloride       fluticasone, oxyCODONE, sodium chloride, ipratropium 0.5 mg-albuterol 2.5 mg, calcium carbonate, sodium chloride flush, sodium chloride, potassium chloride **OR** potassium alternative oral replacement **OR** potassium chloride, magnesium sulfate, ondansetron **OR** ondansetron, polyethylene glycol, acetaminophen **OR** acetaminophen    LABS   CBC   Recent Labs     10/04/24  0932   WBC 16.1*   HGB 9.4*   HCT 29.1*   MCV 92.1        BMP:   Lab Results   Component Value Date/Time     10/04/2024 09:32 AM    K 4.3 10/04/2024 09:32 AM    CL 94 10/04/2024 09:32 AM    CO2 37 10/04/2024 09:32 AM    BUN 22 10/04/2024 09:32 AM    CREATININE 0.7 10/04/2024 09:32 AM    CALCIUM 9.0 10/04/2024 09:32 AM    LABGLOM >90 10/04/2024 09:32 AM     ABGs:  Lab Results   Component Value Date/Time    PHART 7.444 09/29/2024 05:37 PM    PO2ART 78.1 09/29/2024 05:37 PM    SYW4OJJ 71.6 09/29/2024 05:37 PM    No results found for: \"IFIO2\", \"MODE\", \"SETTIDVOL\", \"SETPEEP\"  Ionized Calcium:  No components found for: \"IONCA\"  Magnesium:    Lab Results   Component Value Date/Time    MG 1.9 10/03/2024 07:27 PM     Phosphorus:  No results found

## 2024-10-04 NOTE — DISCHARGE SUMMARY
[facility]   IN-PATIENT SERVICE   [facility]    Discharge Summary     Patient ID: Timothy Kern  :  1950   MRN: [id number]     ACCOUNT:  [id number]   Patient's PCP: No primary care provider on file.  Admit Date: 2024   Discharge Date: 10/4/2024     Length of Stay: 10  Code Status:  DNR-CCA  Admitting Physician: Ambreen Avina MD  Discharge Physician: Nicholas Balbuena MD     Active Discharge Diagnoses:       Primary Problem  CAP (community acquired pneumonia) due to Chlamydia species      Hospital Problems  Active Hospital Problems    Diagnosis Date Noted    Bradycardia [R00.1] 10/02/2024     Priority: High    Acute on chronic respiratory failure with hypoxemia [J96.21] 10/02/2024     Priority: High    Paroxysmal atrial fibrillation (HCC) [I48.0] 10/02/2024     Priority: High    ACP (advance care planning) [Z71.89] 10/03/2024    Encounter for palliative care [Z51.5] 10/03/2024    Other emphysema (HCC) [J43.8] 10/03/2024    Pulmonary emphysema (HCC) [J43.9] 10/03/2024    CAP (community acquired pneumonia) due to Chlamydia species [J16.0] 2024       Admission Condition:  poor     Discharged Condition: fair    Hospital Stay:       Hospital Course:  The patient is a 74 y.o. male who presented with Shortness of Breath.     He has a past medical history of COPD (on 3 L of oxygen at home), atrial fibrillation (on Eliquis), and congestive heart failure.  He was living in [place] and then moved to [place].  We do not have any medical records for him. Today the patient was transferred from [facility] for shortness of breath.  Since the last 2 weeks, his breathing got worse at nursing facility.  There is no history of fever, chills, chest pain. On examination, sounds were absent on the right side, diffuse wheezing on the left side.     On presentation, he was severely tachypneic (respiratory rate 34) and hypoxic. EMS put him on CPAP, which helped him. He does have a  chest, abdomen or pelvis.     XR CHEST PORTABLE    Result Date: 9/25/2024  EXAMINATION: ONE XRAY VIEW OF THE CHEST 9/25/2024 6:33 am COMPARISON: 09/24/2024 HISTORY: ORDERING SYSTEM PROVIDED HISTORY: sob, pneumonia? TECHNOLOGIST PROVIDED HISTORY: sob, pneumonia? Reason for Exam: dyspnea FINDINGS: Lines and tubes: None Stable heart size.  Right upper and lower lobe airspace opacities that could relate to scarring or stable.  No acute consolidation or pneumothorax.     Stable nonspecific airspace opacities in the right upper and lower lobes. This could relate to scarring.     XR ABDOMEN (KUB) (SINGLE AP VIEW)    Result Date: 9/24/2024  EXAMINATION: ONE SUPINE XRAY VIEW(S) OF THE ABDOMEN 9/24/2024 6:38 pm COMPARISON: None. HISTORY: ORDERING SYSTEM PROVIDED HISTORY: RUQ pain TECHNOLOGIST PROVIDED HISTORY: RUQ pain Reason for Exam: RUQ pain X several days FINDINGS: Lines and tubes: None. Bowel gas pattern: Nonobstructing bowel gas pattern. Abdomen and pelvis: No suspicious calcifications. Soft tissue organ contours: Surgical clips within the right upper quadrant. Heavy atherosclerotic calcification of the abdominal aorta and branch vessels.. Bones: No acute osseous findings.     Nonobstructive bowel gas pattern.     XR CHEST PORTABLE    Result Date: 9/24/2024  EXAMINATION: ONE XRAY VIEW OF THE CHEST 9/24/2024 9:31 am COMPARISON: None. HISTORY: ORDERING SYSTEM PROVIDED HISTORY: shortness of breath TECHNOLOGIST PROVIDED HISTORY: shortness of breath Reason for Exam: shortness of breath FINDINGS: Cardiac size is mildly enlarged.  Patchy infiltrate seen in the right mid and lower lung zones with underlying discoid atelectasis..  The pulmonary vascularity is hazy and indistinct. No pneumothorax.  No pleural effusions identified.  Posttraumatic deformity of the mid left clavicle.     1. Patchy infiltrate seen in the right mid and lower lung zones with underlying discoid atelectasis. 2. Mild cardiomegaly with mild pulmonary

## 2024-10-04 NOTE — DISCHARGE INSTRUCTIONS
-If you begin to experience any symptoms such as chest pain shortness of breath nausea vomiting dizziness drowsiness abdominal pain loss of consciousness or any other symptoms you find concerning please return to the ED for follow-up evaluation.  -If you have been given medication please take them as prescribed. Do not take more medication than recommended at any given time. Finish all antibiotic  -Please follow-up with your primary care provider within 7 days for continued care.  -Continue BiPAP at night and with naps. May use as needed during the day.    -Continue tapering prednisone.  -Please call [phone] for a follow up with a pulmonologist and pulmonary function tests.  -Please follow up with your cardiologist within 4 weeks.  -Please continue using your flutter valve and I-S   -Please feel free return to the hospital if your symptoms worsen or any new concerning symptoms develop.  Follow-up with your primary care physician as needed for all other the concerns.

## 2024-10-04 NOTE — PROGRESS NOTES
..PALLIATIVE CARE TEAM    Patient: Timothy Kern  Room: 2111/2111-01    Reason For Consult   Goals of care evaluation  Distress management  Symptom Management  Guidance and support  Facilitate communications  Assistance in coordinating care  Recommendations for the above    Impression: Timothy Kern is a 74 y.o. year old male with  has a past medical history of COPD (chronic obstructive pulmonary disease) (HCC), HTN (hypertension), Hypoxia, On home O2, and Pneumonia..  Currently hospitalized for the management of acute /chronic respiratory failure.  The Palliative Care Team is following to assist with goals of care and support.     Code Status  DNR-CCA  PLAN:   - the patient is alert and oriented and siting at the side of the bed, and he has oxygen per nasal cannula   -Alejandra Bailey rounds and the patient states he is breathing easier with the oxycodone that was added   - I explain the HCPOA and the importance and he states that he would want his son Melvin.   - I attempt to complete and the patient then refuses and states that we don's need that and that he told \" everyone and they wrote it down   - I attempt further explanation in simple terms and he still refuses   - the patient is  and he has 5 biological children and the majority of the 5 are legal decision makers.   Vital Signs:  /78   Pulse 67   Temp 97.1 °F (36.2 °C) (Oral)   Resp 24   Ht 1.753 m (5' 9.02\")   Wt 78.7 kg (173 lb 8 oz)   SpO2 98%   BMI 25.61 kg/m²     Patient Active Problem List   Diagnosis    CAP (community acquired pneumonia) due to Chlamydia species    Bradycardia    Acute on chronic respiratory failure with hypoxemia    Paroxysmal atrial fibrillation (HCC)    ACP (advance care planning)    Encounter for palliative care    Other emphysema (HCC)    Pulmonary emphysema (HCC)       Palliative Interaction:The patient is sitting at the side of the bed, and he has on oxygen per nasal cannula. Alejandra Bailey as well rounds with

## 2024-10-04 NOTE — CARE COORDINATION
Transportation arranged for patient to go to [facility] at 1800 via [facility]. Facility informed. Bedside nurse informed.     Number for Report: [phone]  Electronically signed by GERARDO Ko on 10/4/2024 at 4:49 PM

## 2024-10-04 NOTE — ACP (ADVANCE CARE PLANNING)
..Advance Care Planning     Palliative Team Advance Care Planning (ACP) Conversation    Date of Conversation: 10/04/24    Individuals present for the conversation: Patient with decision making capacity     ACP documents on file prior to discussion:  -None    Previously completed document/s not on file: Patient / participant reports that there are no previously executed ACP documents.    Healthcare Decision Maker: The patient stated to Dr. Roberts that he would appoint his son Melvin Kern to be his HCPOA. I attempted to complete that document and he then refused to complete it, initial or sign.    He states\" I don't need it, they all know what I want here.\"   I continued further conversation and simple explanation and he still refused to complete the document.  I again update him \" you will always be your own decision maker, and this is needed only if you cannot speak for yourself and it would then be your son Melvin who would speak for you. He again refuses.        Patient has 5 biological children and we have contact for son Melvin Kern only. [phone]          Resuscitation Status:   Code Status: DNR-CCA     Documentation Completed:  -No new documents completed.          Tammie Smith RN

## 2024-10-04 NOTE — CARE COORDINATION
Other Treatments After Discharge: see above    Physician Certification: I certify the above information and transfer of Timothy Kern  is necessary for the continuing treatment of the diagnosis listed and that he requires Skilled Nursing Facility for less 30 days.     Update Admission H&P: No change in H&P    PHYSICIAN SIGNATURE:  Electronically signed by Johanne Don MD on 10/3/24 at 11:54 AM EDT    Medication List    START taking these medications    START taking these medications  amiodarone 200 MG tablet  Commonly known as: CORDARONE  Take 1 tablet by mouth 2 times daily for 7 days, THEN 1 tablet daily for 7 days.  Start taking on: October 4, 2024   amLODIPine 10 MG tablet  Commonly known as: NORVASC  Take 1 tablet by mouth daily  Start taking on: October 5, 2024   busPIRone 15 MG tablet  Commonly known as: BUSPAR  Take 15 mg by mouth 3 times daily   cetirizine 10 MG tablet  Commonly known as: ZYRTEC  Take 1 tablet by mouth daily  Start taking on: October 5, 2024   erythromycin 5 MG/GM ophthalmic ointment  Commonly known as: ROMYCIN  Place into the right eye nightly   fluticasone 50 MCG/ACT nasal spray  Commonly known as: FLONASE  1 spray by Each Nostril route daily as needed for Allergies   guaiFENesin 600 MG extended release tablet  Commonly known as: MUCINEX  Take 1 tablet by mouth 2 times daily   * predniSONE 10 MG tablet  Commonly known as: DELTASONE  Take 3 tablets by mouth daily for 3 doses  Start taking on: October 5, 2024   * predniSONE 20 MG tablet  Commonly known as: DELTASONE  Take 1 tablet by mouth daily for 3 doses  Start taking on: October 8, 2024   * predniSONE 10 MG tablet  Commonly known as: DELTASONE  Take 1 tablet by mouth daily for 3 doses  Start taking on: October 11, 2024   sertraline 50 MG tablet  Commonly known as: ZOLOFT  Take 1 tablet by mouth daily  Start taking on: October 5, 2024    very important  * This list has 3 medication(s) that are the same as other medications  prescribed for you. Read the directions carefully, and ask your doctor or other care provider to review them with you.     CHANGE how you take these medications    CHANGE how you take these medications  ipratropium 0.5 mg-albuterol 2.5 mg 0.5-2.5 (3) MG/3ML Soln nebulizer solution  Commonly known as: DUONEB  Inhale 3 mLs into the lungs every 4 hours (while awake)  What changed:  when to take this  reasons to take this     CONTINUE taking these medications    CONTINUE taking these medications  acetaminophen 325 MG tablet  Commonly known as: TYLENOL  Take 2 tablets by mouth every 6 hours as needed for Pain or Fever (Temperature > 101F) Do not exceed 3g/24 hours   Advair Diskus 500-50 MCG/ACT Aepb diskus inhaler  Generic drug: fluticasone-salmeterol  Inhale 1 puff into the lungs 2 times daily   apixaban 5 MG Tabs tablet  Commonly known as: Eliquis  Take 1 tablet by mouth 2 times daily   atorvastatin 40 MG tablet  Commonly known as: LIPITOR  Take 1 tablet by mouth every evening   bisacodyl 10 MG suppository  Commonly known as: DULCOLAX  Place 1 suppository rectally daily as needed for Constipation (constipation) Administer if no relief from Milk of Magnesia   calcium carbonate 500 MG chewable tablet  Commonly known as: TUMS  Take 2 tablets by mouth every 6 hours as needed for Heartburn   furosemide 40 MG tablet  Commonly known as: LASIX  Take 1 tablet by mouth every morning   Milk of Magnesia 400 MG/5ML suspension  Generic drug: magnesium hydroxide  Take 30 mLs by mouth every 72 hours as needed for Constipation If no BM for 3 days   omeprazole 20 MG delayed release capsule  Commonly known as: PRILOSEC  Take 1 capsule by mouth every morning   potassium chloride 20 MEQ Tbcr extended release tablet  Commonly known as: K-TAB  Take 1 tablet by mouth every morning   sodium phosphate 7-19 GM/118ML  Place 1 enema rectally once as needed (constipation) Administer if no relief from dulcolax   Ventolin  (90 Base) MCG/ACT

## 2024-10-04 NOTE — PROGRESS NOTES
IN-PATIENT SERVICE   [facility] Medicine  [facility] Internal Medicine  Dayo Mcadams MD; Zay Ramires MD; Jam Matthew MD; MD Johanne Sanders MD; Min Giordano MD; Ambreen Avina MD        Progress Note    10/4/2024    9:04 AM    Name:   Timothy Kern  MRN:     [id number]     Acct:      [id number]   Room:   2111/2111-01   Day:  10  Admit Date:  9/24/2024 12:33 PM    PCP:   No primary care provider on file.  Code Status:  DNR-CCA    Subjective:     C/C:   Chief Complaint   Patient presents with    Shortness of Breath       Interval History Status: Improving    -Hypertensive in 170's and tachycardic in 130's overnight. Cardio notified. Started on amiodarone drip, transfer to PCU. EKG normal. HR and BP normalized.  -Clinically, noted less irritation in R eye; otherwise, feels the same as yesterday.  -PO steroid taper.  -Pulm onboard, no objection to SNF from their standpoint  -Seen by palliative care  -Case management is working on placement.      HPI:     74-year-old male history of COPD on 3 L nasal cannula oxygen at home, A-fib on Eliquis, HFpEF, transferred from SNF due to shortness of breath for 2 weeks improved with CPAP, ABG showed elevated CO2 with normal pH indicative of chronic CO2 retention.  Chest x-ray concerning for right-sided pneumonia, patient also complained of right upper quadrant pain    Review of Systems:     Positive for shortness of breath  Denies chest pain or palpitations  Denies abdominal pain, diarrhea vomiting  Denies any new numbness tremors or weakness.    Medications:     Allergies:    Allergies   Allergen Reactions    Codeine Rash       Current Meds:   Scheduled Meds:    cetirizine  10 mg Oral Daily    erythromycin   Right Eye 4 times per day    amLODIPine  10 mg Oral Daily    sertraline  50 mg Oral Daily    busPIRone  15 mg Oral TID    predniSONE  40 mg Oral Daily    [START ON 10/5/2024] predniSONE  30 mg Oral Daily    [START ON  10/8/2024] predniSONE  20 mg Oral Daily    [START ON 10/11/2024] predniSONE  10 mg Oral Daily    guaiFENesin  600 mg Oral BID    ipratropium 0.5 mg-albuterol 2.5 mg  1 Dose Inhalation Q4H WA RT    apixaban  5 mg Oral BID    atorvastatin  40 mg Oral QPM    sodium chloride flush  5-40 mL IntraVENous 2 times per day    pantoprazole  40 mg Oral QAM AC    melatonin  3 mg Oral Nightly     Continuous Infusions:    amiodarone Stopped (10/04/24 0520)    sodium chloride       PRN Meds: fluticasone, oxyCODONE, sodium chloride, ipratropium 0.5 mg-albuterol 2.5 mg, calcium carbonate, sodium chloride flush, sodium chloride, potassium chloride **OR** potassium alternative oral replacement **OR** potassium chloride, magnesium sulfate, ondansetron **OR** ondansetron, polyethylene glycol, acetaminophen **OR** acetaminophen    Data:     Past Medical History:   has a past medical history of COPD (chronic obstructive pulmonary disease) (HCC), HTN (hypertension), Hypoxia, On home O2, and Pneumonia.    Social History:   reports that he has never smoked. He has never used smokeless tobacco.     Family History: No family history on file.    Vitals:  /80   Pulse 72   Temp 97.6 °F (36.4 °C) (Oral)   Resp 20   Ht 1.753 m (5' 9.02\")   Wt 78.7 kg (173 lb 8 oz)   SpO2 98%   BMI 25.61 kg/m²   Temp (24hrs), Av.6 °F (36.4 °C), Min:97.1 °F (36.2 °C), Max:98.1 °F (36.7 °C)    No results for input(s): \"POCGLU\" in the last 72 hours.    I/O (24Hr):    Intake/Output Summary (Last 24 hours) at 10/4/2024 0904  Last data filed at 10/4/2024 0606  Gross per 24 hour   Intake --   Output 700 ml   Net -700 ml       Labs:    Lab Results   Component Value Date    WBC 15.9 (H) 10/03/2024    HGB 9.2 (L) 10/03/2024    HCT 28.3 (L) 10/03/2024    MCV 93.1 10/03/2024     10/03/2024     Lab Results   Component Value Date/Time     10/03/2024 07:35 AM    K 4.7 10/03/2024 07:35 AM    CL 92 10/03/2024 07:35 AM    CO2 42 10/03/2024 07:35 AM    BUN

## 2024-10-04 NOTE — PLAN OF CARE
Problem: Discharge Planning  Goal: Discharge to home or other facility with appropriate resources  10/3/2024 2232 by Jose Castorena RN  Outcome: Progressing  Flowsheets (Taken 10/3/2024 2036)  Discharge to home or other facility with appropriate resources: Identify barriers to discharge with patient and caregiver     Problem: Pain  Goal: Verbalizes/displays adequate comfort level or baseline comfort level  10/3/2024 2232 by Jose Castorena RN  Outcome: Progressing     Problem: Cardiovascular - Adult  Goal: Maintains optimal cardiac output and hemodynamic stability  10/3/2024 2232 by Jose Castorena RN  Outcome: Progressing  Flowsheets (Taken 10/3/2024 2036)  Maintains optimal cardiac output and hemodynamic stability:   Monitor blood pressure and heart rate   Monitor urine output and notify Licensed Independent Practitioner for values outside of normal range     Problem: Skin/Tissue Integrity - Adult  Goal: Incisions, wounds, or drain sites healing without S/S of infection  10/3/2024 2232 by Jose Castorena RN  Outcome: Progressing  Flowsheets (Taken 10/3/2024 2036)  Incisions, Wounds, or Drain Sites Healing Without Sign and Symptoms of Infection: ADMISSION and DAILY: Assess and document risk factors for pressure ulcer development

## 2024-10-04 NOTE — CARE COORDINATION
IMM letter provided to patient.  Patient offered four hours to make informed decision regarding appeal process; patient agreeable to discharge.   Electronically signed by GERARDO Ko on 10/4/2024 at 11:53 AM

## 2024-10-04 NOTE — PROGRESS NOTES
[facility]   OCCUPATIONAL THERAPY MISSED TREATMENT NOTE   INPATIENT   Date: 10/4/24  Patient Name: Timothy Kern       Room:   MRN: [id number]   Account #: [id number]    : 1950  (74 y.o.)  Gender: male   Referring Practitioner: Franklin Herzog MD  Diagnosis: COPD exacerbation             REASON FOR MISSED TREATMENT:  Patient declined   -    upon arrival pt resting in bed with PCA present and setting pt up for a shower, this writer attempting to participate pt in self care tasks with pt stating \"I can only wash my hair\" then stating he cannot do \"anything else\" as far as bathing, this writer discussed with pt that this writer will assist with tasks as needed but also therapy allows pt to complete tasks as able to promote/increase indep, pt then states \"well I don't feel like do that much work today so let's do it another day\", this writer providing education with pt continuing to decline. Will continue to follow as needed.       Electronically signed by Corin MON on 10/4/24 at 3:33 PM EDT

## 2024-10-04 NOTE — PROGRESS NOTES
Pt is discharged to [facility]. IV and telemetry removed. Report called. All questions answered

## 2024-10-05 LAB
EKG ATRIAL RATE: 60 BPM
EKG P AXIS: 19 DEGREES
EKG P-R INTERVAL: 114 MS
EKG Q-T INTERVAL: 270 MS
EKG Q-T INTERVAL: 382 MS
EKG QRS DURATION: 76 MS
EKG QRS DURATION: 86 MS
EKG QTC CALCULATION (BAZETT): 382 MS
EKG QTC CALCULATION (BAZETT): 441 MS
EKG R AXIS: 55 DEGREES
EKG R AXIS: 84 DEGREES
EKG T AXIS: 42 DEGREES
EKG T AXIS: 66 DEGREES
EKG VENTRICULAR RATE: 161 BPM
EKG VENTRICULAR RATE: 60 BPM

## 2024-10-05 PROCEDURE — 93010 ELECTROCARDIOGRAM REPORT: CPT | Performed by: INTERNAL MEDICINE

## 2024-10-08 LAB
EKG ATRIAL RATE: 42 BPM
EKG P AXIS: 75 DEGREES
EKG P-R INTERVAL: 156 MS
EKG Q-T INTERVAL: 420 MS
EKG QRS DURATION: 82 MS
EKG QTC CALCULATION (BAZETT): 350 MS
EKG R AXIS: 51 DEGREES
EKG T AXIS: 75 DEGREES
EKG VENTRICULAR RATE: 42 BPM

## 2024-10-08 PROCEDURE — 93010 ELECTROCARDIOGRAM REPORT: CPT | Performed by: INTERNAL MEDICINE

## 2024-11-04 ENCOUNTER — HOSPITAL ENCOUNTER (OUTPATIENT)
Age: 74
Setting detail: SPECIMEN
Discharge: HOME | End: 2024-11-04
Payer: MEDICARE

## 2024-11-04 LAB
BILIRUB UR QL STRIP: NEGATIVE
CLARITY UR: CLEAR
COLOR UR: YELLOW
EPI CELLS #/AREA URNS HPF: NORMAL /HPF (ref 0–5)
GLUCOSE UR STRIP-MCNC: NEGATIVE MG/DL
HGB UR QL STRIP.AUTO: NEGATIVE
KETONES UR STRIP-MCNC: NEGATIVE MG/DL
LEUKOCYTE ESTERASE UR QL STRIP: NEGATIVE
NITRITE UR QL STRIP: NEGATIVE
PH UR STRIP: 6 [PH] (ref 5–8)
PROT UR STRIP-MCNC: ABNORMAL MG/DL
RBC #/AREA URNS HPF: NORMAL /HPF (ref 0–2)
SP GR UR STRIP: 1.02 (ref 1–1.03)
UROBILINOGEN UR STRIP-ACNC: NORMAL EU/DL (ref 0–1)
WBC #/AREA URNS HPF: NORMAL /HPF (ref 0–5)

## 2024-11-04 PROCEDURE — 81001 URINALYSIS AUTO W/SCOPE: CPT

## 2024-11-04 PROCEDURE — 87086 URINE CULTURE/COLONY COUNT: CPT

## 2024-11-05 LAB
MICROORGANISM SPEC CULT: NO GROWTH
SPECIMEN DESCRIPTION: NORMAL